# Patient Record
Sex: MALE | Race: WHITE | NOT HISPANIC OR LATINO
[De-identification: names, ages, dates, MRNs, and addresses within clinical notes are randomized per-mention and may not be internally consistent; named-entity substitution may affect disease eponyms.]

---

## 2017-01-13 ENCOUNTER — APPOINTMENT (OUTPATIENT)
Dept: CARDIOLOGY | Facility: CLINIC | Age: 77
End: 2017-01-13

## 2017-01-27 ENCOUNTER — APPOINTMENT (OUTPATIENT)
Dept: CARDIOLOGY | Facility: CLINIC | Age: 77
End: 2017-01-27

## 2017-01-27 VITALS
DIASTOLIC BLOOD PRESSURE: 86 MMHG | HEIGHT: 72 IN | SYSTOLIC BLOOD PRESSURE: 139 MMHG | OXYGEN SATURATION: 100 % | HEART RATE: 75 BPM | WEIGHT: 170 LBS | BODY MASS INDEX: 23.03 KG/M2

## 2017-01-27 RX ORDER — ROSUVASTATIN CALCIUM 5 MG/1
5 TABLET, FILM COATED ORAL
Refills: 0 | Status: ACTIVE | COMMUNITY

## 2017-05-25 ENCOUNTER — APPOINTMENT (OUTPATIENT)
Dept: VASCULAR SURGERY | Facility: CLINIC | Age: 77
End: 2017-05-25

## 2017-05-25 VITALS
HEIGHT: 72 IN | BODY MASS INDEX: 23.7 KG/M2 | SYSTOLIC BLOOD PRESSURE: 120 MMHG | DIASTOLIC BLOOD PRESSURE: 64 MMHG | WEIGHT: 175 LBS

## 2017-06-08 ENCOUNTER — APPOINTMENT (OUTPATIENT)
Dept: VASCULAR SURGERY | Facility: CLINIC | Age: 77
End: 2017-06-08

## 2017-06-08 ENCOUNTER — OUTPATIENT (OUTPATIENT)
Dept: OUTPATIENT SERVICES | Facility: HOSPITAL | Age: 77
LOS: 1 days | Discharge: HOME | End: 2017-06-08

## 2017-06-08 DIAGNOSIS — Z95.0 PRESENCE OF CARDIAC PACEMAKER: ICD-10-CM

## 2017-06-08 DIAGNOSIS — I63.9 CEREBRAL INFARCTION, UNSPECIFIED: ICD-10-CM

## 2017-06-08 DIAGNOSIS — K21.9 GASTRO-ESOPHAGEAL REFLUX DISEASE WITHOUT ESOPHAGITIS: ICD-10-CM

## 2017-06-08 DIAGNOSIS — I77.9 DISORDER OF ARTERIES AND ARTERIOLES, UNSPECIFIED: ICD-10-CM

## 2017-06-08 DIAGNOSIS — E78.00 PURE HYPERCHOLESTEROLEMIA, UNSPECIFIED: ICD-10-CM

## 2017-06-08 DIAGNOSIS — I50.9 HEART FAILURE, UNSPECIFIED: ICD-10-CM

## 2017-06-08 DIAGNOSIS — E11.9 TYPE 2 DIABETES MELLITUS WITHOUT COMPLICATIONS: ICD-10-CM

## 2017-06-08 DIAGNOSIS — Z95.5 PRESENCE OF CORONARY ANGIOPLASTY IMPLANT AND GRAFT: ICD-10-CM

## 2017-06-08 DIAGNOSIS — L03.90 CELLULITIS, UNSPECIFIED: ICD-10-CM

## 2017-06-08 DIAGNOSIS — I10 ESSENTIAL (PRIMARY) HYPERTENSION: ICD-10-CM

## 2017-06-08 DIAGNOSIS — I25.10 ATHEROSCLEROTIC HEART DISEASE OF NATIVE CORONARY ARTERY WITHOUT ANGINA PECTORIS: ICD-10-CM

## 2017-06-08 DIAGNOSIS — I65.29 OCCLUSION AND STENOSIS OF UNSPECIFIED CAROTID ARTERY: ICD-10-CM

## 2017-06-08 DIAGNOSIS — R06.02 SHORTNESS OF BREATH: ICD-10-CM

## 2017-06-14 ENCOUNTER — TRANSCRIPTION ENCOUNTER (OUTPATIENT)
Age: 77
End: 2017-06-14

## 2017-06-19 ENCOUNTER — APPOINTMENT (OUTPATIENT)
Dept: CARDIOLOGY | Facility: CLINIC | Age: 77
End: 2017-06-19

## 2017-06-27 ENCOUNTER — APPOINTMENT (OUTPATIENT)
Dept: VASCULAR SURGERY | Facility: HOSPITAL | Age: 77
End: 2017-06-27

## 2017-06-27 ENCOUNTER — INPATIENT (INPATIENT)
Facility: HOSPITAL | Age: 77
LOS: 1 days | Discharge: NURSING, REHAB OR LONG-TERM CARE | End: 2017-06-29
Attending: SURGERY

## 2017-06-27 DIAGNOSIS — I65.29 OCCLUSION AND STENOSIS OF UNSPECIFIED CAROTID ARTERY: ICD-10-CM
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
## 2017-06-27 DIAGNOSIS — I77.9 DISORDER OF ARTERIES AND ARTERIOLES, UNSPECIFIED: ICD-10-CM

## 2017-06-27 DIAGNOSIS — L03.90 CELLULITIS, UNSPECIFIED: ICD-10-CM

## 2017-06-27 DIAGNOSIS — R06.02 SHORTNESS OF BREATH: ICD-10-CM

## 2017-06-27 DIAGNOSIS — T87.89 OTHER COMPLICATIONS OF AMPUTATION STUMP: ICD-10-CM

## 2017-06-28 DIAGNOSIS — Z01.818 ENCOUNTER FOR OTHER PREPROCEDURAL EXAMINATION: ICD-10-CM

## 2017-06-28 DIAGNOSIS — M79.605 PAIN IN LEFT LEG: ICD-10-CM

## 2017-06-29 ENCOUNTER — INPATIENT (INPATIENT)
Facility: HOSPITAL | Age: 77
LOS: 14 days | Discharge: NURSING, REHAB OR LONG-TERM CARE | End: 2017-07-14
Attending: PHYSICAL MEDICINE & REHABILITATION

## 2017-06-29 DIAGNOSIS — I65.29 OCCLUSION AND STENOSIS OF UNSPECIFIED CAROTID ARTERY: ICD-10-CM

## 2017-06-29 DIAGNOSIS — I77.9 DISORDER OF ARTERIES AND ARTERIOLES, UNSPECIFIED: ICD-10-CM

## 2017-06-29 DIAGNOSIS — L03.90 CELLULITIS, UNSPECIFIED: ICD-10-CM

## 2017-06-29 DIAGNOSIS — R06.02 SHORTNESS OF BREATH: ICD-10-CM

## 2017-06-30 ENCOUNTER — APPOINTMENT (OUTPATIENT)
Dept: VASCULAR SURGERY | Facility: HOSPITAL | Age: 77
End: 2017-06-30

## 2017-07-05 DIAGNOSIS — T87.89 OTHER COMPLICATIONS OF AMPUTATION STUMP: ICD-10-CM

## 2017-07-05 DIAGNOSIS — M79.605 PAIN IN LEFT LEG: ICD-10-CM

## 2017-07-05 DIAGNOSIS — Z86.73 PERSONAL HISTORY OF TRANSIENT ISCHEMIC ATTACK (TIA), AND CEREBRAL INFARCTION WITHOUT RESIDUAL DEFICITS: ICD-10-CM

## 2017-07-05 DIAGNOSIS — I50.9 HEART FAILURE, UNSPECIFIED: ICD-10-CM

## 2017-07-05 DIAGNOSIS — M77.9 ENTHESOPATHY, UNSPECIFIED: ICD-10-CM

## 2017-07-05 DIAGNOSIS — M19.90 UNSPECIFIED OSTEOARTHRITIS, UNSPECIFIED SITE: ICD-10-CM

## 2017-07-05 DIAGNOSIS — E11.59 TYPE 2 DIABETES MELLITUS WITH OTHER CIRCULATORY COMPLICATIONS: ICD-10-CM

## 2017-07-05 DIAGNOSIS — K44.9 DIAPHRAGMATIC HERNIA WITHOUT OBSTRUCTION OR GANGRENE: ICD-10-CM

## 2017-07-05 DIAGNOSIS — Z95.1 PRESENCE OF AORTOCORONARY BYPASS GRAFT: ICD-10-CM

## 2017-07-05 DIAGNOSIS — I25.10 ATHEROSCLEROTIC HEART DISEASE OF NATIVE CORONARY ARTERY WITHOUT ANGINA PECTORIS: ICD-10-CM

## 2017-07-05 DIAGNOSIS — Z95.810 PRESENCE OF AUTOMATIC (IMPLANTABLE) CARDIAC DEFIBRILLATOR: ICD-10-CM

## 2017-07-05 DIAGNOSIS — K21.9 GASTRO-ESOPHAGEAL REFLUX DISEASE WITHOUT ESOPHAGITIS: ICD-10-CM

## 2017-07-05 DIAGNOSIS — I25.2 OLD MYOCARDIAL INFARCTION: ICD-10-CM

## 2017-07-05 DIAGNOSIS — E11.51 TYPE 2 DIABETES MELLITUS WITH DIABETIC PERIPHERAL ANGIOPATHY WITHOUT GANGRENE: ICD-10-CM

## 2017-07-05 DIAGNOSIS — L02.214 CUTANEOUS ABSCESS OF GROIN: ICD-10-CM

## 2017-07-05 DIAGNOSIS — I73.9 PERIPHERAL VASCULAR DISEASE, UNSPECIFIED: ICD-10-CM

## 2017-07-05 DIAGNOSIS — I73.00 RAYNAUD'S SYNDROME WITHOUT GANGRENE: ICD-10-CM

## 2017-07-05 DIAGNOSIS — K27.9 PEPTIC ULCER, SITE UNSPECIFIED, UNSPECIFIED AS ACUTE OR CHRONIC, WITHOUT HEMORRHAGE OR PERFORATION: ICD-10-CM

## 2017-07-18 DIAGNOSIS — Z89.512 ACQUIRED ABSENCE OF LEFT LEG BELOW KNEE: ICD-10-CM

## 2017-07-18 DIAGNOSIS — Z91.14 PATIENT'S OTHER NONCOMPLIANCE WITH MEDICATION REGIMEN: ICD-10-CM

## 2017-07-18 DIAGNOSIS — Z47.81 ENCOUNTER FOR ORTHOPEDIC AFTERCARE FOLLOWING SURGICAL AMPUTATION: ICD-10-CM

## 2017-07-18 DIAGNOSIS — I25.10 ATHEROSCLEROTIC HEART DISEASE OF NATIVE CORONARY ARTERY WITHOUT ANGINA PECTORIS: ICD-10-CM

## 2017-07-18 DIAGNOSIS — G89.29 OTHER CHRONIC PAIN: ICD-10-CM

## 2017-07-18 DIAGNOSIS — K44.9 DIAPHRAGMATIC HERNIA WITHOUT OBSTRUCTION OR GANGRENE: ICD-10-CM

## 2017-07-18 DIAGNOSIS — E11.51 TYPE 2 DIABETES MELLITUS WITH DIABETIC PERIPHERAL ANGIOPATHY WITHOUT GANGRENE: ICD-10-CM

## 2017-07-18 DIAGNOSIS — I48.91 UNSPECIFIED ATRIAL FIBRILLATION: ICD-10-CM

## 2017-07-18 DIAGNOSIS — I50.20 UNSPECIFIED SYSTOLIC (CONGESTIVE) HEART FAILURE: ICD-10-CM

## 2017-07-18 DIAGNOSIS — K21.9 GASTRO-ESOPHAGEAL REFLUX DISEASE WITHOUT ESOPHAGITIS: ICD-10-CM

## 2017-07-18 DIAGNOSIS — L02.214 CUTANEOUS ABSCESS OF GROIN: ICD-10-CM

## 2017-07-18 DIAGNOSIS — Z95.810 PRESENCE OF AUTOMATIC (IMPLANTABLE) CARDIAC DEFIBRILLATOR: ICD-10-CM

## 2017-07-18 DIAGNOSIS — M79.674 PAIN IN RIGHT TOE(S): ICD-10-CM

## 2017-07-18 DIAGNOSIS — M19.90 UNSPECIFIED OSTEOARTHRITIS, UNSPECIFIED SITE: ICD-10-CM

## 2017-07-18 DIAGNOSIS — R60.0 LOCALIZED EDEMA: ICD-10-CM

## 2017-07-18 DIAGNOSIS — Z86.73 PERSONAL HISTORY OF TRANSIENT ISCHEMIC ATTACK (TIA), AND CEREBRAL INFARCTION WITHOUT RESIDUAL DEFICITS: ICD-10-CM

## 2017-07-18 DIAGNOSIS — Z95.1 PRESENCE OF AORTOCORONARY BYPASS GRAFT: ICD-10-CM

## 2017-07-20 ENCOUNTER — APPOINTMENT (OUTPATIENT)
Dept: VASCULAR SURGERY | Facility: CLINIC | Age: 77
End: 2017-07-20

## 2017-07-20 DIAGNOSIS — I50.22 CHRONIC SYSTOLIC (CONGESTIVE) HEART FAILURE: ICD-10-CM

## 2017-08-03 ENCOUNTER — APPOINTMENT (OUTPATIENT)
Dept: VASCULAR SURGERY | Facility: CLINIC | Age: 77
End: 2017-08-03
Payer: MEDICARE

## 2017-08-03 PROCEDURE — 99024 POSTOP FOLLOW-UP VISIT: CPT

## 2017-08-14 ENCOUNTER — INPATIENT (INPATIENT)
Facility: HOSPITAL | Age: 77
LOS: 2 days | Discharge: HOME | End: 2017-08-17
Attending: INTERNAL MEDICINE | Admitting: INTERNAL MEDICINE

## 2017-08-14 DIAGNOSIS — L03.90 CELLULITIS, UNSPECIFIED: ICD-10-CM

## 2017-08-14 DIAGNOSIS — R06.02 SHORTNESS OF BREATH: ICD-10-CM

## 2017-08-14 DIAGNOSIS — I77.9 DISORDER OF ARTERIES AND ARTERIOLES, UNSPECIFIED: ICD-10-CM

## 2017-08-14 DIAGNOSIS — I65.29 OCCLUSION AND STENOSIS OF UNSPECIFIED CAROTID ARTERY: ICD-10-CM

## 2017-08-21 DIAGNOSIS — Z95.1 PRESENCE OF AORTOCORONARY BYPASS GRAFT: ICD-10-CM

## 2017-08-21 DIAGNOSIS — I25.10 ATHEROSCLEROTIC HEART DISEASE OF NATIVE CORONARY ARTERY WITHOUT ANGINA PECTORIS: ICD-10-CM

## 2017-08-21 DIAGNOSIS — I11.0 HYPERTENSIVE HEART DISEASE WITH HEART FAILURE: ICD-10-CM

## 2017-08-21 DIAGNOSIS — N40.0 BENIGN PROSTATIC HYPERPLASIA WITHOUT LOWER URINARY TRACT SYMPTOMS: ICD-10-CM

## 2017-08-21 DIAGNOSIS — K58.9 IRRITABLE BOWEL SYNDROME WITHOUT DIARRHEA: ICD-10-CM

## 2017-08-21 DIAGNOSIS — E11.51 TYPE 2 DIABETES MELLITUS WITH DIABETIC PERIPHERAL ANGIOPATHY WITHOUT GANGRENE: ICD-10-CM

## 2017-08-21 DIAGNOSIS — M10.9 GOUT, UNSPECIFIED: ICD-10-CM

## 2017-08-21 DIAGNOSIS — Z95.810 PRESENCE OF AUTOMATIC (IMPLANTABLE) CARDIAC DEFIBRILLATOR: ICD-10-CM
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
## 2017-08-21 DIAGNOSIS — Z89.512 ACQUIRED ABSENCE OF LEFT LEG BELOW KNEE: ICD-10-CM

## 2017-08-21 DIAGNOSIS — Z86.73 PERSONAL HISTORY OF TRANSIENT ISCHEMIC ATTACK (TIA), AND CEREBRAL INFARCTION WITHOUT RESIDUAL DEFICITS: ICD-10-CM

## 2017-08-21 DIAGNOSIS — I50.23 ACUTE ON CHRONIC SYSTOLIC (CONGESTIVE) HEART FAILURE: ICD-10-CM

## 2017-08-21 DIAGNOSIS — I73.00 RAYNAUD'S SYNDROME WITHOUT GANGRENE: ICD-10-CM

## 2017-08-21 DIAGNOSIS — K22.70 BARRETT'S ESOPHAGUS WITHOUT DYSPLASIA: ICD-10-CM

## 2017-08-21 DIAGNOSIS — L03.90 CELLULITIS, UNSPECIFIED: ICD-10-CM

## 2017-08-21 DIAGNOSIS — K21.9 GASTRO-ESOPHAGEAL REFLUX DISEASE WITHOUT ESOPHAGITIS: ICD-10-CM

## 2017-08-21 DIAGNOSIS — T82.190A OTHER MECHANICAL COMPLICATION OF CARDIAC ELECTRODE, INITIAL ENCOUNTER: ICD-10-CM

## 2017-08-21 DIAGNOSIS — I48.91 UNSPECIFIED ATRIAL FIBRILLATION: ICD-10-CM

## 2017-08-21 DIAGNOSIS — I25.2 OLD MYOCARDIAL INFARCTION: ICD-10-CM

## 2017-08-21 DIAGNOSIS — E11.9 TYPE 2 DIABETES MELLITUS WITHOUT COMPLICATIONS: ICD-10-CM

## 2017-08-24 DIAGNOSIS — I70.291 OTHER ATHEROSCLEROSIS OF NATIVE ARTERIES OF EXTREMITIES, RIGHT LEG: ICD-10-CM

## 2017-08-24 DIAGNOSIS — M19.071 PRIMARY OSTEOARTHRITIS, RIGHT ANKLE AND FOOT: ICD-10-CM

## 2017-09-01 ENCOUNTER — APPOINTMENT (OUTPATIENT)
Dept: CARDIOLOGY | Facility: CLINIC | Age: 77
End: 2017-09-01

## 2017-09-07 ENCOUNTER — APPOINTMENT (OUTPATIENT)
Dept: VASCULAR SURGERY | Facility: CLINIC | Age: 77
End: 2017-09-07
Payer: MEDICARE

## 2017-09-07 VITALS — WEIGHT: 190 LBS | HEIGHT: 72 IN | BODY MASS INDEX: 25.73 KG/M2

## 2017-09-07 VITALS — SYSTOLIC BLOOD PRESSURE: 118 MMHG | DIASTOLIC BLOOD PRESSURE: 74 MMHG

## 2017-09-07 DIAGNOSIS — L97.909 OTHER COMPLICATIONS OF AMPUTATION STUMP: ICD-10-CM

## 2017-09-07 DIAGNOSIS — T87.89 OTHER COMPLICATIONS OF AMPUTATION STUMP: ICD-10-CM

## 2017-09-07 PROCEDURE — 99213 OFFICE O/P EST LOW 20 MIN: CPT | Mod: 24

## 2017-09-11 ENCOUNTER — APPOINTMENT (OUTPATIENT)
Dept: CARDIOLOGY | Facility: CLINIC | Age: 77
End: 2017-09-11

## 2017-09-11 VITALS — WEIGHT: 190 LBS | DIASTOLIC BLOOD PRESSURE: 64 MMHG | SYSTOLIC BLOOD PRESSURE: 110 MMHG | BODY MASS INDEX: 25.77 KG/M2

## 2017-09-11 DIAGNOSIS — I47.2 VENTRICULAR TACHYCARDIA: ICD-10-CM

## 2017-09-12 PROBLEM — I47.2 VENTRICULAR TACHYCARDIA: Status: ACTIVE | Noted: 2017-09-12

## 2017-09-21 ENCOUNTER — APPOINTMENT (OUTPATIENT)
Dept: VASCULAR SURGERY | Facility: CLINIC | Age: 77
End: 2017-09-21

## 2017-09-28 ENCOUNTER — APPOINTMENT (OUTPATIENT)
Dept: VASCULAR SURGERY | Facility: CLINIC | Age: 77
End: 2017-09-28
Payer: MEDICARE

## 2017-09-28 DIAGNOSIS — L97.519 NON-PRESSURE CHRONIC ULCER OF OTHER PART OF RIGHT FOOT WITH UNSPECIFIED SEVERITY: ICD-10-CM

## 2017-09-28 PROCEDURE — 99213 OFFICE O/P EST LOW 20 MIN: CPT

## 2017-09-29 ENCOUNTER — APPOINTMENT (OUTPATIENT)
Dept: CARDIOLOGY | Facility: CLINIC | Age: 77
End: 2017-09-29

## 2017-09-29 VITALS
HEIGHT: 72 IN | BODY MASS INDEX: 25.73 KG/M2 | WEIGHT: 190 LBS | OXYGEN SATURATION: 100 % | SYSTOLIC BLOOD PRESSURE: 113 MMHG | HEART RATE: 70 BPM | DIASTOLIC BLOOD PRESSURE: 65 MMHG

## 2017-10-13 ENCOUNTER — APPOINTMENT (OUTPATIENT)
Dept: CARDIOLOGY | Facility: CLINIC | Age: 77
End: 2017-10-13

## 2017-10-18 ENCOUNTER — APPOINTMENT (OUTPATIENT)
Dept: CARDIOLOGY | Facility: CLINIC | Age: 77
End: 2017-10-18

## 2017-10-18 VITALS
DIASTOLIC BLOOD PRESSURE: 52 MMHG | HEART RATE: 70 BPM | BODY MASS INDEX: 25.77 KG/M2 | SYSTOLIC BLOOD PRESSURE: 110 MMHG | WEIGHT: 190 LBS

## 2017-10-18 DIAGNOSIS — I48.92 UNSPECIFIED ATRIAL FLUTTER: ICD-10-CM

## 2017-10-18 RX ORDER — AZELASTINE HYDROCHLORIDE 0.5 MG/ML
0.05 SOLUTION/ DROPS OPHTHALMIC
Qty: 6 | Refills: 0 | Status: ACTIVE | COMMUNITY
Start: 2017-08-28

## 2017-10-18 RX ORDER — APIXABAN 5 MG/1
5 TABLET, FILM COATED ORAL
Qty: 60 | Refills: 0 | Status: ACTIVE | COMMUNITY
Start: 2017-09-12

## 2017-10-18 RX ORDER — ONDANSETRON 8 MG/1
8 TABLET ORAL
Qty: 30 | Refills: 0 | Status: ACTIVE | COMMUNITY
Start: 2017-09-02

## 2017-10-18 RX ORDER — CIPROFLOXACIN 3 MG/ML
0.3 SOLUTION OPHTHALMIC
Qty: 5 | Refills: 0 | Status: ACTIVE | COMMUNITY
Start: 2017-06-14

## 2017-10-18 RX ORDER — LIDOCAINE 5 G/100G
5 OINTMENT TOPICAL
Qty: 50 | Refills: 0 | Status: ACTIVE | COMMUNITY
Start: 2017-08-31

## 2017-10-25 ENCOUNTER — APPOINTMENT (OUTPATIENT)
Dept: CARDIOLOGY | Facility: CLINIC | Age: 77
End: 2017-10-25

## 2017-10-26 ENCOUNTER — APPOINTMENT (OUTPATIENT)
Dept: VASCULAR SURGERY | Facility: CLINIC | Age: 77
End: 2017-10-26
Payer: MEDICARE

## 2017-10-26 PROCEDURE — 99213 OFFICE O/P EST LOW 20 MIN: CPT

## 2017-10-30 ENCOUNTER — OUTPATIENT (OUTPATIENT)
Dept: OUTPATIENT SERVICES | Facility: HOSPITAL | Age: 77
LOS: 1 days | Discharge: HOME | End: 2017-10-30

## 2017-10-30 ENCOUNTER — APPOINTMENT (OUTPATIENT)
Dept: CARDIOLOGY | Facility: CLINIC | Age: 77
End: 2017-10-30

## 2017-10-30 DIAGNOSIS — I25.10 ATHEROSCLEROTIC HEART DISEASE OF NATIVE CORONARY ARTERY WITHOUT ANGINA PECTORIS: ICD-10-CM

## 2017-10-30 DIAGNOSIS — I50.22 CHRONIC SYSTOLIC (CONGESTIVE) HEART FAILURE: ICD-10-CM

## 2017-10-30 DIAGNOSIS — I77.9 DISORDER OF ARTERIES AND ARTERIOLES, UNSPECIFIED: ICD-10-CM

## 2017-10-30 DIAGNOSIS — I25.10 ATHEROSCLEROTIC HEART DISEASE OF NATIVE CORONARY ARTERY W/OUT ANGINA PECTORIS: ICD-10-CM

## 2017-10-30 DIAGNOSIS — I65.29 OCCLUSION AND STENOSIS OF UNSPECIFIED CAROTID ARTERY: ICD-10-CM

## 2017-10-30 DIAGNOSIS — R06.02 SHORTNESS OF BREATH: ICD-10-CM

## 2017-10-30 DIAGNOSIS — L03.90 CELLULITIS, UNSPECIFIED: ICD-10-CM

## 2017-11-01 ENCOUNTER — OUTPATIENT (OUTPATIENT)
Dept: OUTPATIENT SERVICES | Facility: HOSPITAL | Age: 77
LOS: 1 days | Discharge: HOME | End: 2017-11-01

## 2017-11-01 DIAGNOSIS — L03.90 CELLULITIS, UNSPECIFIED: ICD-10-CM

## 2017-11-01 DIAGNOSIS — T87.89 OTHER COMPLICATIONS OF AMPUTATION STUMP: ICD-10-CM

## 2017-11-01 DIAGNOSIS — R06.02 SHORTNESS OF BREATH: ICD-10-CM

## 2017-11-01 DIAGNOSIS — I77.9 DISORDER OF ARTERIES AND ARTERIOLES, UNSPECIFIED: ICD-10-CM

## 2017-11-01 DIAGNOSIS — I65.29 OCCLUSION AND STENOSIS OF UNSPECIFIED CAROTID ARTERY: ICD-10-CM

## 2017-11-22 ENCOUNTER — TRANSCRIPTION ENCOUNTER (OUTPATIENT)
Age: 77
End: 2017-11-22

## 2017-12-07 ENCOUNTER — APPOINTMENT (OUTPATIENT)
Dept: VASCULAR SURGERY | Facility: CLINIC | Age: 77
End: 2017-12-07
Payer: MEDICARE

## 2017-12-07 PROCEDURE — 99213 OFFICE O/P EST LOW 20 MIN: CPT

## 2017-12-07 PROCEDURE — 93880 EXTRACRANIAL BILAT STUDY: CPT

## 2017-12-18 ENCOUNTER — OUTPATIENT (OUTPATIENT)
Dept: OUTPATIENT SERVICES | Facility: HOSPITAL | Age: 77
LOS: 1 days | Discharge: HOME | End: 2017-12-18

## 2017-12-18 DIAGNOSIS — R06.02 SHORTNESS OF BREATH: ICD-10-CM

## 2017-12-18 DIAGNOSIS — L03.90 CELLULITIS, UNSPECIFIED: ICD-10-CM

## 2017-12-18 DIAGNOSIS — I77.9 DISORDER OF ARTERIES AND ARTERIOLES, UNSPECIFIED: ICD-10-CM

## 2017-12-18 DIAGNOSIS — I65.29 OCCLUSION AND STENOSIS OF UNSPECIFIED CAROTID ARTERY: ICD-10-CM

## 2017-12-20 DIAGNOSIS — L03.012 CELLULITIS OF LEFT FINGER: ICD-10-CM

## 2017-12-21 ENCOUNTER — OUTPATIENT (OUTPATIENT)
Dept: OUTPATIENT SERVICES | Facility: HOSPITAL | Age: 77
LOS: 1 days | Discharge: HOME | End: 2017-12-21

## 2017-12-21 DIAGNOSIS — I77.9 DISORDER OF ARTERIES AND ARTERIOLES, UNSPECIFIED: ICD-10-CM

## 2017-12-21 DIAGNOSIS — D53.9 NUTRITIONAL ANEMIA, UNSPECIFIED: ICD-10-CM

## 2017-12-21 DIAGNOSIS — I65.29 OCCLUSION AND STENOSIS OF UNSPECIFIED CAROTID ARTERY: ICD-10-CM

## 2017-12-21 DIAGNOSIS — L03.90 CELLULITIS, UNSPECIFIED: ICD-10-CM

## 2017-12-21 DIAGNOSIS — M79.0 RHEUMATISM, UNSPECIFIED: ICD-10-CM

## 2017-12-21 DIAGNOSIS — R06.02 SHORTNESS OF BREATH: ICD-10-CM

## 2018-02-05 ENCOUNTER — APPOINTMENT (OUTPATIENT)
Dept: VASCULAR SURGERY | Facility: CLINIC | Age: 78
End: 2018-02-05
Payer: MEDICARE

## 2018-02-05 DIAGNOSIS — I65.23 OCCLUSION AND STENOSIS OF BILATERAL CAROTID ARTERIES: ICD-10-CM

## 2018-02-05 DIAGNOSIS — G89.18 OTHER COMPLICATIONS OF AMPUTATION STUMP: ICD-10-CM

## 2018-02-05 DIAGNOSIS — T87.89 OTHER COMPLICATIONS OF AMPUTATION STUMP: ICD-10-CM

## 2018-02-05 PROCEDURE — 99213 OFFICE O/P EST LOW 20 MIN: CPT

## 2018-02-05 PROCEDURE — 99214 OFFICE O/P EST MOD 30 MIN: CPT

## 2018-05-07 ENCOUNTER — OUTPATIENT (OUTPATIENT)
Dept: OUTPATIENT SERVICES | Facility: HOSPITAL | Age: 78
LOS: 1 days | Discharge: HOME | End: 2018-05-07

## 2018-05-07 DIAGNOSIS — E78.00 PURE HYPERCHOLESTEROLEMIA, UNSPECIFIED: ICD-10-CM

## 2018-05-07 DIAGNOSIS — N39.0 URINARY TRACT INFECTION, SITE NOT SPECIFIED: ICD-10-CM

## 2018-05-07 DIAGNOSIS — E11.9 TYPE 2 DIABETES MELLITUS WITHOUT COMPLICATIONS: ICD-10-CM

## 2018-05-14 ENCOUNTER — EMERGENCY (EMERGENCY)
Facility: HOSPITAL | Age: 78
LOS: 0 days | Discharge: HOME | End: 2018-05-14
Attending: EMERGENCY MEDICINE | Admitting: EMERGENCY MEDICINE

## 2018-05-14 ENCOUNTER — TRANSCRIPTION ENCOUNTER (OUTPATIENT)
Age: 78
End: 2018-05-14

## 2018-05-14 VITALS
TEMPERATURE: 98 F | HEART RATE: 70 BPM | RESPIRATION RATE: 20 BRPM | DIASTOLIC BLOOD PRESSURE: 57 MMHG | SYSTOLIC BLOOD PRESSURE: 116 MMHG | OXYGEN SATURATION: 99 %

## 2018-05-14 DIAGNOSIS — Z98.890 OTHER SPECIFIED POSTPROCEDURAL STATES: ICD-10-CM

## 2018-05-14 DIAGNOSIS — Z89.512 ACQUIRED ABSENCE OF LEFT LEG BELOW KNEE: Chronic | ICD-10-CM

## 2018-05-14 DIAGNOSIS — E11.9 TYPE 2 DIABETES MELLITUS WITHOUT COMPLICATIONS: ICD-10-CM

## 2018-05-14 DIAGNOSIS — S09.90XA UNSPECIFIED INJURY OF HEAD, INITIAL ENCOUNTER: ICD-10-CM

## 2018-05-14 DIAGNOSIS — S22.32XA FRACTURE OF ONE RIB, LEFT SIDE, INITIAL ENCOUNTER FOR CLOSED FRACTURE: ICD-10-CM

## 2018-05-14 DIAGNOSIS — R07.81 PLEURODYNIA: ICD-10-CM
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
## 2018-05-14 DIAGNOSIS — Z95.0 PRESENCE OF CARDIAC PACEMAKER: ICD-10-CM
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
## 2018-05-14 DIAGNOSIS — I50.9 HEART FAILURE, UNSPECIFIED: ICD-10-CM

## 2018-05-14 RX ORDER — IBUPROFEN 200 MG
600 TABLET ORAL ONCE
Qty: 0 | Refills: 0 | Status: COMPLETED | OUTPATIENT
Start: 2018-05-14 | End: 2018-05-14

## 2018-05-14 RX ORDER — IBUPROFEN 200 MG
1 TABLET ORAL
Qty: 40 | Refills: 0 | OUTPATIENT
Start: 2018-05-14 | End: 2018-05-23

## 2018-05-14 RX ADMIN — Medication 600 MILLIGRAM(S): at 15:33
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
## 2018-05-14 NOTE — ED PROVIDER NOTE - PHYSICAL EXAMINATION
CONST: Well appearing in NAD  EYES: PERRL, EOMI, Sclera and conjunctiva clear.  ENT: No nasal discharge. TM's clear B/L without drainage. Oropharynx normal appearing, no erythema or exudates. Uvula midline.  CARD: Normal S1 S2; Normal rate and rhythm  RESP: Equal BS B/L, No wheezes, rhonchi or rales.  GI: Soft, non-tender, non-distended, (-) CVA tenderness B/L, no overlying skin changes  MS: (+) left lower anterior rib TTP, Normal ROM in all extremities. No midline spinal tenderness.  SKIN: Warm, dry, no acute rashes. Good turgor  NEURO: A&Ox3, No focal deficits. Strength 5/5 with no sensory deficits. Steady gait

## 2018-05-14 NOTE — ED PROVIDER NOTE - OBJECTIVE STATEMENT
Patient is a 76 yo male with PM hx of AICD placement, left BKA, presents to the ER s/p fall yesterday. Patient states he tripped over his dog and fell hitting his head and left side of chest. Patient was seen earlier today at [facility] and had x-rays but were unable to identify any ribs fractures and was sent into ER for further evaluation. Patient denies any other symptoms, without any other complaints

## 2018-05-14 NOTE — ED ADULT NURSE NOTE - OBJECTIVE STATEMENT
Pt is a 76 y/o male presented with rib pain s/p mechanical fall on Saturday, stating fell on left side tripping over his dog. no anticoags no loc, no n/v, no dizziness, tender to touch, no c/o chest pain. no fever.

## 2018-05-14 NOTE — ED PROVIDER NOTE - NS ED ROS FT
CONST: No fever  EYES: No pain, redness, drainage or visual changes.  ENT: No ear pain or discharge, nasal discharge or congestion. No sore throat  CARD: (+) left sided lower frontal rib pain  RESP: No SOB, cough  GI: No abdominal pain, N/V/D  MS: (+) left lower back pain  SKIN: No rashes  NEURO: No headache, dizziness, paresthesias or LOC

## 2018-05-14 NOTE — ED ADULT NURSE NOTE - PMH
Problem: Knowledge Deficit  Goal: Knowledge of disease process/condition, treatment plan, diagnostic tests, and medications will improve  Outcome: PROGRESSING AS EXPECTED  Educated on disease process.     Problem: Pain Management  Goal: Pain level will decrease to patient’s comfort goal  Outcome: PROGRESSING AS EXPECTED  Pt complains of chest pain. Relieved with tylenol          Gar esophagus    CAD (coronary artery disease)    CHF (congestive heart failure)    DM (diabetes mellitus)    Endarteritis    MI (myocardial infarction)    Pacemaker    Stented coronary artery

## 2018-05-14 NOTE — ED ADULT TRIAGE NOTE - CHIEF COMPLAINT QUOTE
Pt states s/p fall yesterday attempting to move away from dog and lost balance landing on L side. - Loc not on any blood thinners. Pt c/o L sided rib pain sent in from Urgent care.

## 2018-05-19 ENCOUNTER — INPATIENT (INPATIENT)
Facility: HOSPITAL | Age: 78
LOS: 5 days | Discharge: HOME HEALTH | End: 2018-05-25
Attending: INTERNAL MEDICINE | Admitting: INTERNAL MEDICINE

## 2018-05-19 VITALS
SYSTOLIC BLOOD PRESSURE: 118 MMHG | TEMPERATURE: 96 F | HEART RATE: 95 BPM | RESPIRATION RATE: 18 BRPM | OXYGEN SATURATION: 96 % | DIASTOLIC BLOOD PRESSURE: 61 MMHG
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
## 2018-05-19 DIAGNOSIS — Z89.512 ACQUIRED ABSENCE OF LEFT LEG BELOW KNEE: Chronic | ICD-10-CM

## 2018-05-19 DIAGNOSIS — Z95.1 PRESENCE OF AORTOCORONARY BYPASS GRAFT: Chronic | ICD-10-CM

## 2018-05-19 LAB
ALBUMIN SERPL ELPH-MCNC: 2.7 G/DL — LOW (ref 3.5–5.2)
ALP SERPL-CCNC: 96 U/L — SIGNIFICANT CHANGE UP (ref 30–115)
ALT FLD-CCNC: 14 U/L — SIGNIFICANT CHANGE UP (ref 0–41)
ANION GAP SERPL CALC-SCNC: 12 MMOL/L — SIGNIFICANT CHANGE UP (ref 7–14)
ANION GAP SERPL CALC-SCNC: 14 MMOL/L — SIGNIFICANT CHANGE UP (ref 7–14)
APTT BLD: 29.8 SEC — SIGNIFICANT CHANGE UP (ref 27–39.2)
AST SERPL-CCNC: 42 U/L — HIGH (ref 0–41)
BASE EXCESS BLDV CALC-SCNC: 2.6 MMOL/L — HIGH (ref -2–2)
BASOPHILS # BLD AUTO: 0.05 K/UL — SIGNIFICANT CHANGE UP (ref 0–0.2)
BASOPHILS NFR BLD AUTO: 0.6 % — SIGNIFICANT CHANGE UP (ref 0–1)
BILIRUB SERPL-MCNC: 0.5 MG/DL — SIGNIFICANT CHANGE UP (ref 0.2–1.2)
BUN SERPL-MCNC: 57 MG/DL — HIGH (ref 10–20)
BUN SERPL-MCNC: 62 MG/DL — CRITICAL HIGH (ref 10–20)
CA-I SERPL-SCNC: 1.11 MMOL/L — LOW (ref 1.12–1.3)
CALCIUM SERPL-MCNC: 7.8 MG/DL — LOW (ref 8.5–10.1)
CALCIUM SERPL-MCNC: 8 MG/DL — LOW (ref 8.5–10.1)
CHLORIDE SERPL-SCNC: 97 MMOL/L — LOW (ref 98–110)
CHLORIDE SERPL-SCNC: 98 MMOL/L — SIGNIFICANT CHANGE UP (ref 98–110)
CK MB CFR SERPL CALC: 7.3 NG/ML — HIGH (ref 0.6–6.3)
CK MB CFR SERPL CALC: 7.7 NG/ML — HIGH (ref 0.6–6.3)
CK SERPL-CCNC: 174 U/L — SIGNIFICANT CHANGE UP (ref 0–225)
CK SERPL-CCNC: 192 U/L — SIGNIFICANT CHANGE UP (ref 0–225)
CO2 SERPL-SCNC: 25 MMOL/L — SIGNIFICANT CHANGE UP (ref 17–32)
CO2 SERPL-SCNC: 28 MMOL/L — SIGNIFICANT CHANGE UP (ref 17–32)
CREAT SERPL-MCNC: 2.1 MG/DL — HIGH (ref 0.7–1.5)
CREAT SERPL-MCNC: 2.6 MG/DL — HIGH (ref 0.7–1.5)
EOSINOPHIL # BLD AUTO: 0.11 K/UL — SIGNIFICANT CHANGE UP (ref 0–0.7)
EOSINOPHIL NFR BLD AUTO: 1.4 % — SIGNIFICANT CHANGE UP (ref 0–8)
GAS PNL BLDV: 136 MMOL/L — SIGNIFICANT CHANGE UP (ref 136–145)
GAS PNL BLDV: SIGNIFICANT CHANGE UP
GAS PNL BLDV: SIGNIFICANT CHANGE UP
GLUCOSE SERPL-MCNC: 111 MG/DL — HIGH (ref 70–99)
GLUCOSE SERPL-MCNC: 154 MG/DL — HIGH (ref 70–99)
HCO3 BLDV-SCNC: 29 MMOL/L — SIGNIFICANT CHANGE UP (ref 22–29)
HCT VFR BLD CALC: 43.5 % — SIGNIFICANT CHANGE UP (ref 42–52)
HCT VFR BLDA CALC: 44.3 % — HIGH (ref 34–44)
HGB BLD CALC-MCNC: 14.5 G/DL — SIGNIFICANT CHANGE UP (ref 14–18)
HGB BLD-MCNC: 13.5 G/DL — LOW (ref 14–18)
IMM GRANULOCYTES NFR BLD AUTO: 0.4 % — HIGH (ref 0.1–0.3)
INR BLD: 1.18 RATIO — SIGNIFICANT CHANGE UP (ref 0.65–1.3)
LACTATE BLDV-MCNC: 1.7 MMOL/L — HIGH (ref 0.5–1.6)
LACTATE SERPL-SCNC: 1.6 MMOL/L — SIGNIFICANT CHANGE UP (ref 0.5–2.2)
LYMPHOCYTES # BLD AUTO: 0.44 K/UL — LOW (ref 1.2–3.4)
LYMPHOCYTES # BLD AUTO: 5.5 % — LOW (ref 20.5–51.1)
MCHC RBC-ENTMCNC: 25.2 PG — LOW (ref 27–31)
MCHC RBC-ENTMCNC: 31 G/DL — LOW (ref 32–37)
MCV RBC AUTO: 81.3 FL — SIGNIFICANT CHANGE UP (ref 80–94)
MONOCYTES # BLD AUTO: 1.13 K/UL — HIGH (ref 0.1–0.6)
MONOCYTES NFR BLD AUTO: 14.2 % — HIGH (ref 1.7–9.3)
NEUTROPHILS # BLD AUTO: 6.19 K/UL — SIGNIFICANT CHANGE UP (ref 1.4–6.5)
NEUTROPHILS NFR BLD AUTO: 77.9 % — HIGH (ref 42.2–75.2)
NRBC # BLD: 0 /100 WBCS — SIGNIFICANT CHANGE UP (ref 0–0)
NT-PROBNP SERPL-SCNC: HIGH PG/ML (ref 0–300)
PCO2 BLDV: 50 MMHG — SIGNIFICANT CHANGE UP (ref 41–51)
PH BLDV: 7.37 — SIGNIFICANT CHANGE UP (ref 7.26–7.43)
PLATELET # BLD AUTO: 279 K/UL — SIGNIFICANT CHANGE UP (ref 130–400)
PO2 BLDV: 27 MMHG — SIGNIFICANT CHANGE UP (ref 20–40)
POTASSIUM BLDV-SCNC: 3.9 MMOL/L — SIGNIFICANT CHANGE UP (ref 3.3–5.6)
POTASSIUM SERPL-MCNC: 4.1 MMOL/L — SIGNIFICANT CHANGE UP (ref 3.5–5)
POTASSIUM SERPL-MCNC: 5.7 MMOL/L — HIGH (ref 3.5–5)
POTASSIUM SERPL-SCNC: 4.1 MMOL/L — SIGNIFICANT CHANGE UP (ref 3.5–5)
POTASSIUM SERPL-SCNC: 5.7 MMOL/L — HIGH (ref 3.5–5)
PROT SERPL-MCNC: 5 G/DL — LOW (ref 6–8)
PROTHROM AB SERPL-ACNC: 12.8 SEC — SIGNIFICANT CHANGE UP (ref 9.95–12.87)
RBC # BLD: 5.35 M/UL — SIGNIFICANT CHANGE UP (ref 4.7–6.1)
RBC # FLD: 17.2 % — HIGH (ref 11.5–14.5)
SAO2 % BLDV: 39 % — SIGNIFICANT CHANGE UP
SODIUM SERPL-SCNC: 136 MMOL/L — SIGNIFICANT CHANGE UP (ref 135–146)
SODIUM SERPL-SCNC: 138 MMOL/L — SIGNIFICANT CHANGE UP (ref 135–146)
TROPONIN T SERPL-MCNC: 0.09 NG/ML — CRITICAL HIGH
TROPONIN T SERPL-MCNC: 0.1 NG/ML — CRITICAL HIGH
WBC # BLD: 7.95 K/UL — SIGNIFICANT CHANGE UP (ref 4.8–10.8)
WBC # FLD AUTO: 7.95 K/UL — SIGNIFICANT CHANGE UP (ref 4.8–10.8)

## 2018-05-19 RX ORDER — FAMOTIDINE 10 MG/ML
20 INJECTION INTRAVENOUS DAILY
Qty: 0 | Refills: 0 | Status: DISCONTINUED | OUTPATIENT
Start: 2018-05-19 | End: 2018-05-25

## 2018-05-19 RX ORDER — FUROSEMIDE 40 MG
1 TABLET ORAL
Qty: 0 | Refills: 0 | COMMUNITY

## 2018-05-19 RX ORDER — FAMOTIDINE 10 MG/ML
5 INJECTION INTRAVENOUS
Qty: 0 | Refills: 0 | COMMUNITY

## 2018-05-19 RX ORDER — HEPARIN SODIUM 5000 [USP'U]/ML
5000 INJECTION INTRAVENOUS; SUBCUTANEOUS EVERY 8 HOURS
Qty: 0 | Refills: 0 | Status: DISCONTINUED | OUTPATIENT
Start: 2018-05-19 | End: 2018-05-21

## 2018-05-19 RX ORDER — FAMOTIDINE 10 MG/ML
1 INJECTION INTRAVENOUS
Qty: 0 | Refills: 0 | COMMUNITY

## 2018-05-19 RX ORDER — FUROSEMIDE 40 MG
60 TABLET ORAL ONCE
Qty: 0 | Refills: 0 | Status: COMPLETED | OUTPATIENT
Start: 2018-05-19 | End: 2018-05-19

## 2018-05-19 RX ADMIN — Medication 60 MILLIGRAM(S): at 19:52

## 2018-05-19 NOTE — ED ADULT NURSE NOTE - PMH
Gar esophagus    CAD (coronary artery disease)    CHF (congestive heart failure)    DM (diabetes mellitus)    Endarteritis    MI (myocardial infarction)    Pacemaker    Stented coronary artery

## 2018-05-19 NOTE — CONSULT NOTE ADULT - ASSESSMENT
CHF exacerbation could be secondary to V pacing patient mentioned malfunctioning lead ?  the patient is not in BIV mode   maybe related to ADAN ?  CAD sp CABG LIMA SVG to OM patent SVG to PDA has descrete thrombotic lesion on 2014 cath   LV systolic Dysfunction ischemic cardiomyopathy ef 275   ZAC on CKD Creatinine increase from 1.52 to 2.6  DM     Plan:  Iv lasix 60mg q 24   check IO daily weight  Nephrology consultation  EPS consultation for device interrogation  and possible generatir change   medication reconciliation   Repeat CXR in AM CHF exacerbation   CAD sp CABG LIMA SVG to OM patent SVG to PDA has descrete thrombotic lesion on 2014 cath   LV systolic Dysfunction ischemic cardiomyopathy ef 20   ZAC on CKD Creatinine increase from 1.52 to 2.6  DM     Plan:  Iv lasix 60mg q 12  low dose bb, acei   check IO daily weight  Nephrology consultation  EPS consultation for device interrogation   medication reconciliation   Repeat CXR in AM

## 2018-05-19 NOTE — H&P ADULT - NSHPLABSRESULTS_GEN_ALL_CORE
13.5   7.95  )-----------( 279      ( 19 May 2018 15:45 )             43.5     136  |  97<L>  |  57<H>  ----------------------------<  111<H>  5.7<H>   |  25  |  2.1<H>    Ca    8.0<L>      19 May 2018 15:45    TPro  5.0<L>  /  Alb  2.7<L>  /  TBili  0.5  /  DBili  x   /  AST  42<H>  /  ALT  14  /  AlkPhos  96  05-19          PT/INR - ( 19 May 2018 15:45 )   PT: 12.80 sec;   INR: 1.18 ratio         PTT - ( 19 May 2018 15:45 )  PTT:29.8 sec    Lactate Trend  05-19 @ 15:45 Lactate:1.6     CARDIAC MARKERS ( 19 May 2018 15:45 )  x     / 0.09 ng/mL / 192 U/L / x     / 7.7 ng/mL    Serum Pro-Brain Natriuretic Peptide (05.19.18 @ 15:45)    Serum Pro-Brain Natriuretic Peptide: 53101 pg/mL    EKG: paced rhythm, 95BPM, PVCs    Chest Xray: cardiomegaly, pulmonary vascular congestion and R pleural effusion, ?trace left pleural effusion    2d Echo 1/19/15 EF 10%  MUGA 2/9/15 EF 27%

## 2018-05-19 NOTE — H&P ADULT - PMH
Gar esophagus    CAD (coronary artery disease)    CHF (congestive heart failure)    DM (diabetes mellitus)    Endarteritis    MI (myocardial infarction)    Pacemaker    Raynauds phenomenon    Stented coronary artery

## 2018-05-19 NOTE — H&P ADULT - ASSESSMENT
78 yo male with HFrEF s/p AICD/PPM, CAD s/p MI and CABG 2001, Gar's esophagus, Raynaud's PAD/PVD s/p L BKA presents with gradually worsening FRIAS for approximately 1 week. Admitted for CHF exacerbation, AICD battery end life and ZAC    1. CHF exacerbation, less likely ACS  MUGA scan 2015 revealed EF 27%  will obtain repeat 2d echo, trend CE  BNP >00021  Patient takes lasix 80 every other day at home  will give 1 dose of 60 IV and re-evaluate, monitor daily weights, Is and Os  f/u AM cxray  f/u cardiology evaluation with Dr Paz    2. AICD battery end of life + malfunctioning lead, as per patient  will obtain EP eval with Dr Sosa  monitor on Tele    3. ZAC on CKD versus progression of CKD or cardiorenal syndrome  baseline 1.4-1.5 in 2017  check urine lytes, Urine Creat, Renal bladder US and daily BMPs  consider renal eval if renal function continues to worsen    4. GI ppx - patient takes up to 100 mg of pepcid daily  advised patient that this is too high of a dose, especially with his renal fuction, will give 20 daily    5. DVT ppx - heparin 78 yo male with HFrEF s/p AICD/PPM, CAD s/p MI and CABG 2001, Gar's esophagus, Raynaud's PAD/PVD s/p L BKA presents with gradually worsening FRIAS for approximately 1 week. Admitted for CHF exacerbation, AICD battery end life and ZAC    1. CHF exacerbation, less likely ACS  MUGA scan 2015 revealed EF 27%  will obtain repeat 2d echo, trend CE  BNP >72793  Patient takes lasix 80 every other day at home  will give 1 dose of 60 IV and re-evaluate, monitor daily weights, Is and Os  f/u AM cxray  f/u cardiology evaluation with Dr Paz    2. AICD battery end of life + malfunctioning lead, as per patient  will obtain EP eval with Dr Sosa  monitor on Tele    3. ZAC on CKD versus progression of CKD or cardiorenal syndrome  baseline 1.4-1.5 in 2017  check urine lytes, Urine Creat,   daily BMPs  Renal bladder US if renal function worsens  consider renal eval if renal function continues to worsen    4. GI ppx - patient takes up to 100 mg of pepcid daily  advised patient that this is too high of a dose, especially with his renal fuction, will give 20 daily    5. DVT ppx - heparin

## 2018-05-19 NOTE — H&P ADULT - NSHPPHYSICALEXAM_GEN_ALL_CORE
T(C): 35.8 (05-19-18 @ 16:48), Max: 35.8 (05-19-18 @ 16:48)  HR: 75 (05-19-18 @ 16:48) (75 - 95)  BP: 105/57 (05-19-18 @ 16:48) (105/57 - 118/61)  RR: 17 (05-19-18 @ 16:48) (17 - 18)  SpO2: 100% (05-19-18 @ 16:48) (96% - 100%)    PHYSICAL EXAM:  GENERAL: NAD, speaking in full sentences  HEAD:  Atraumatic, Normocephalic  EYES: EOMI, PERRLA, conjunctiva and sclera clear  ENT: No tonsillar exudate, swelling or erythema.  NECK: Supple, + JVD  CHEST/LUNG: bibasilar crackles  HEART: Regular rate and rhythm   ABDOMEN: Soft, Nontender, Nondistended; Bowel sounds present  EXTREMITIES:  Periferal cyanosis, mild pitting edema RLE, s/pBKA of LLE  PSYCH: AAOx3  NEUROLOGY: non-focal

## 2018-05-19 NOTE — CONSULT NOTE ADULT - SUBJECTIVE AND OBJECTIVE BOX
CHIEF COMPLAINT:Patient is a 77y old  Male who presents with a chief complaint of FRIAS x1 week (19 May 2018 18:21)    HISTORY OF PRESENT ILLNESS:  HPI:  78 yo male with HFrEF s/p AICD/PPM, CAD s/p MI and CABG 2001, Gar's esophagus, Raynaud's PAD/PVD s/p L BKA presents with gradually worsening FRIAS for approximately 1 week. Patient states that he feels fine at rest, however becomes SOB when moving or changing positions. He cannot walk a block and it takes him a very long time to walk up a few steps in his house. He denies any CP, palpitations, cough, dizziness or lightheadedness. Of note, patient saw his EP Dr Sosa a few months ago, and was told that his AICD battery is nearing end of life and that one of the leads is not working properly. In the ED, he was found to be in fluid overload with a BNP 04372, mildly elevated troponins and ZAC. He did not receive any medications    Patient states that he is prescribed Lasix 80, which he takes every other day, last taken 2 days ago (19 May 2018 18:21)    PAST MEDICAL & SURGICAL HISTORY:  Raynauds phenomenon  Endarteritis  CAD (coronary artery disease)  Stented coronary artery  MI (myocardial infarction)  Pacemaker  DM (diabetes mellitus)  Gar esophagus  CHF (congestive heart failure)  S/P CABG (coronary artery bypass graft)  S/P BKA (below knee amputation), left    FAMILY HISTORY:  No pertinent family history in first degree relatives    Allergies    Allergy Status Unknown    Intolerances    	  Home Medications:  famotidine 20 mg oral tablet: 5 tab(s) orally once a day, As Needed (19 May 2018 18:32)  Lasix 80 mg oral tablet: 1 tab(s) orally every other day (at bedtime) (19 May 2018 18:32)    MEDICATIONS  (STANDING):  famotidine    Tablet 20 milliGRAM(s) Oral daily  heparin  Injectable 5000 Unit(s) SubCutaneous every 8 hours    MEDICATIONS  (PRN):    SOCIAL HISTORY:    [ x] Non-smoker  [ ] Smoker  [ ] Alcohol  REVIEW OF SYSTEMS:  negative except as above   PHYSICAL EXAM:  T(C): 35.8 (05-19-18 @ 16:48), Max: 35.8 (05-19-18 @ 16:48)  HR: 95 (05-19-18 @ 19:30) (75 - 95)  BP: 127/58 (05-19-18 @ 19:30) (105/57 - 127/58)  RR: 18 (05-19-18 @ 19:30) (17 - 18)  SpO2: 97% (05-19-18 @ 19:30) (96% - 100%)    General Appearance: Normal	  Cardiovascular: Normal S1 S2, No JVD,  No edema  Respiratory:bibasilar crackles   Psychiatry: A & O x 3, Mood & affect appropriate  Gastrointestinal:  Soft, Non-tender  Skin: No rashes, No ecchymoses, No cyanosis	  Neurologic: Non-focal  Extremities:L bka     LABS:	 	                        13.5   7.95  )-----------( 279      ( 19 May 2018 15:45 )             43.5     05-19    138  |  98  |  62<HH>  ----------------------------<  154<H>  4.1   |  28  |  2.6<H>  05-19    136  |  97<L>  |  57<H>  ----------------------------<  111<H>  5.7<H>   |  25  |  2.1<H>    Ca    7.8<L>      19 May 2018 21:04  Ca    8.0<L>      19 May 2018 15:45    TPro  5.0<L>  /  Alb  2.7<L>  /  TBili  0.5  /  DBili  x   /  AST  42<H>  /  ALT  14  /  AlkPhos  96  05-19    proBNP: Serum Pro-Brain Natriuretic Peptide: 67956 pg/mL (05-19 @ 15:45)    Lipid Profile:   HgA1c:   TSH:   CARDIAC MARKERS:  CARDIAC MARKERS ( 19 May 2018 21:04 )   U/L / CKMB7.3 ng/mL / Troponin T0.10 ng/mL /  CARDIAC MARKERS ( 19 May 2018 15:45 )   U/L / CKMB7.7 ng/mL / Troponin T0.09 ng/mL /      TELEMETRY EVENTS: 	    ECG:  	< from: 12 Lead ECG (05.19.18 @ 14:37) >  Diagnosis Line Ventricular-paced rhythm with occasional Premature ventricular complexes  Abnormal ECG    Confirmed by ROBERTO GONZALES MD ([id number]) on 5/19/2018 5:45:04 PM    < end of copied text >    RADIOLOGY:  OTHER: 	  PREVIOUS DIAGNOSTIC TESTING:    [ ] Echocardiogram:2015  Summary   Clinical question: CELLULITIS.   Left ventricle: The ventricle was moderately dilated. Systolic function was   severely reduced. Ejection fraction was estimated to be 10 %. There was   diffuse hypokinesis.   Aortic valve: There was mild to moderate regurgitation.   Left atrium: The atrium was moderately to markedly dilated.   Pulmonic valve: There was trivial regurgitation.   Pulmonary arteries: Systolic pressure was moderately to markedly increased.   Estimated peak pressure was 61 mmHg.   Tricuspid valve: There was mild to moderate regurgitation.   Inferior vena cava, hepatic veins: The inferior vena cava was mildly dilated.   The respirophasic change in diameter was less than 50%.       [ ]  Catheterization:  CORONARY CIRCULATION: The coronary circulation is right dominant. There was   significant 3-vessel coronary artery disease (LAD, RCA, and circumflex). Left   main: The vessel was medium sized. Angiography showed mild atherosclerosis   with no flow limiting lesions. Proximal LAD: There was a 100 % stenosis.   Proximal circumflex: There was a 100 % stenosis. Proximal RCA: There was a   100 % stenosis. Graft to the mid LAD: The graft was a LIMA. Graft angiography   showed no evidence of disease. Graft to the 1st obtuse marginal: The graft   was a saphenous vein graft from the aorta. Graft angiography showed no   evidence of disease. Graft to the RPDA: The graft was a saphenous vein graft   from the aorta. Graft angiography showed a single discrete thrombotic lesion   in the distal third of the vessel. 		    MUGA 2015  Impression:   1. Left ventricular ejection fraction of 27%, significantly decreased from 44% on July 26, 2011. (Ventriculographic ejection fraction 30% of 3/11/14; echocardiographic estimated ejection fraction 10% on 1/19/15).   2. The right and left ventricles are dilated with global hypokinesis CHIEF COMPLAINT:Patient is a 77y old  Male who presents with a chief complaint of FRIAS x1 week (19 May 2018 18:21)    HISTORY OF PRESENT ILLNESS:  HPI:  78 yo male with HFrEF s/p AICD/PPM, CAD s/p MI and CABG 2001, Gar's esophagus, Raynaud's PAD/PVD s/p L BKA presents with gradually worsening FRIAS for approximately 1 week. Patient states that he feels fine at rest, however becomes SOB when moving or changing positions. He cannot walk a block and it takes him a very long time to walk up a few steps in his house. He denies any CP, palpitations, cough, dizziness or lightheadedness. Of note, patient saw his EP Dr Sosa a few months ago, and was told that his AICD battery is nearing end of life and that one of the leads is not working properly. In the ED, he was found to be in fluid overload with a BNP 24672, mildly elevated troponins and ZAC. He did not receive any medications    Patient states that he is prescribed Lasix 80, which he takes every other day, last taken 2 days ago (19 May 2018 18:21)    PAST MEDICAL & SURGICAL HISTORY:  Raynauds phenomenon  Endarteritis  CAD (coronary artery disease)  Stented coronary artery  MI (myocardial infarction)  Pacemaker  DM (diabetes mellitus)  Agr esophagus  CHF (congestive heart failure)  S/P CABG (coronary artery bypass graft)  S/P BKA (below knee amputation), left    FAMILY HISTORY:  No pertinent family history in first degree relatives    Allergies    Allergy Status Unknown      Home Medications:  famotidine 20 mg oral tablet: 5 tab(s) orally once a day, As Needed (19 May 2018 18:32)  Lasix 80 mg oral tablet: 1 tab(s) orally every other day (at bedtime) (19 May 2018 18:32)    MEDICATIONS  (STANDING):  famotidine    Tablet 20 milliGRAM(s) Oral daily  heparin  Injectable 5000 Unit(s) SubCutaneous every 8 hours    MEDICATIONS  (PRN):    SOCIAL HISTORY:    [ x] Non-smoker  [ ] Smoker  [ ] Alcohol  REVIEW OF SYSTEMS:  negative except as above   PHYSICAL EXAM:  T(C): 35.8 (05-19-18 @ 16:48), Max: 35.8 (05-19-18 @ 16:48)  HR: 95 (05-19-18 @ 19:30) (75 - 95)  BP: 127/58 (05-19-18 @ 19:30) (105/57 - 127/58)  RR: 18 (05-19-18 @ 19:30) (17 - 18)  SpO2: 97% (05-19-18 @ 19:30) (96% - 100%)    General Appearance: Normal	  Cardiovascular: Normal S1 S2, No JVD,  No edema  Respiratory:bibasilar crackles   Psychiatry: A & O x 3, Mood & affect appropriate  Gastrointestinal:  Soft, Non-tender  Skin: No rashes, No ecchymoses, No cyanosis	  Neurologic: Non-focal  Extremities:L bka     LABS:	 	                        13.5   7.95  )-----------( 279      ( 19 May 2018 15:45 )             43.5     05-19    138  |  98  |  62<HH>  ----------------------------<  154<H>  4.1   |  28  |  2.6<H>  05-19    136  |  97<L>  |  57<H>  ----------------------------<  111<H>  5.7<H>   |  25  |  2.1<H>    Ca    7.8<L>      19 May 2018 21:04  Ca    8.0<L>      19 May 2018 15:45    TPro  5.0<L>  /  Alb  2.7<L>  /  TBili  0.5  /  DBili  x   /  AST  42<H>  /  ALT  14  /  AlkPhos  96  05-19    proBNP: Serum Pro-Brain Natriuretic Peptide: 27448 pg/mL (05-19 @ 15:45)    Lipid Profile:   HgA1c:   TSH:   CARDIAC MARKERS:  CARDIAC MARKERS ( 19 May 2018 21:04 )   U/L / CKMB7.3 ng/mL / Troponin T0.10 ng/mL /  CARDIAC MARKERS ( 19 May 2018 15:45 )   U/L / CKMB7.7 ng/mL / Troponin T0.09 ng/mL /      TELEMETRY EVENTS: 	    ECG:  	< from: 12 Lead ECG (05.19.18 @ 14:37) >  Diagnosis Line Ventricular-paced rhythm with occasional Premature ventricular complexes  Abnormal ECG    Confirmed by ROBERTO GONZALES MD ([id number]) on 5/19/2018 5:45:04 PM    < end of copied text >    RADIOLOGY:  OTHER: 	  PREVIOUS DIAGNOSTIC TESTING:    [ ] Echocardiogram:2015  Summary   Clinical question: CELLULITIS.   Left ventricle: The ventricle was moderately dilated. Systolic function was   severely reduced. Ejection fraction was estimated to be 10 %. There was   diffuse hypokinesis.   Aortic valve: There was mild to moderate regurgitation.   Left atrium: The atrium was moderately to markedly dilated.   Pulmonic valve: There was trivial regurgitation.   Pulmonary arteries: Systolic pressure was moderately to markedly increased.   Estimated peak pressure was 61 mmHg.   Tricuspid valve: There was mild to moderate regurgitation.   Inferior vena cava, hepatic veins: The inferior vena cava was mildly dilated.   The respirophasic change in diameter was less than 50%.       [ ]  Catheterization:  CORONARY CIRCULATION: The coronary circulation is right dominant. There was   significant 3-vessel coronary artery disease (LAD, RCA, and circumflex). Left   main: The vessel was medium sized. Angiography showed mild atherosclerosis   with no flow limiting lesions. Proximal LAD: There was a 100 % stenosis.   Proximal circumflex: There was a 100 % stenosis. Proximal RCA: There was a   100 % stenosis. Graft to the mid LAD: The graft was a LIMA. Graft angiography   showed no evidence of disease. Graft to the 1st obtuse marginal: The graft   was a saphenous vein graft from the aorta. Graft angiography showed no   evidence of disease. Graft to the RPDA: The graft was a saphenous vein graft   from the aorta. Graft angiography showed a single discrete thrombotic lesion   in the distal third of the vessel. 		    MUGA 2015  Impression:   1. Left ventricular ejection fraction of 27%, significantly decreased from 44% on July 26, 2011. (Ventriculographic ejection fraction 30% of 3/11/14; echocardiographic estimated ejection fraction 10% on 1/19/15).   2. The right and left ventricles are dilated with global hypokinesis

## 2018-05-19 NOTE — ED PROVIDER NOTE - CARE PLAN
Principal Discharge DX:	Pleural effusion  Secondary Diagnosis:	CHF (congestive heart failure)  Secondary Diagnosis:	AICD battery failure Principal Discharge DX:	Pleural effusion  Secondary Diagnosis:	CHF (congestive heart failure)  Secondary Diagnosis:	Elevated troponin

## 2018-05-19 NOTE — H&P ADULT - ATTENDING COMMENTS
76 yo male with HFrEF s/p AICD/PPM, CAD s/p MI   and CABG 2001, Gar's esophagus, Raynaud's PAD/PVD s/p L BKA presents with gradually worsening FRIAS for approximately 1 week. Patient states that he feels fine at rest, however becomes SOB when moving or changing positions   AICD battery end of life + malfunctioning lead, as per patient  will obtain EP eval with Dr Sosa    Pager No. [phone]

## 2018-05-19 NOTE — H&P ADULT - HISTORY OF PRESENT ILLNESS
76 yo male with HFrEF s/p AICD/PPM, CAD s/p MI and CABG 2001, Gar's esophagus, Raynaud's PAD/PVD s/p L BKA presents with gradually worsening FRIAS for approximately 1 week. Patient states that he feels fine at rest, however becomes SOB when moving or changing positions. He cannot walk a block and it takes him a very long time to walk up a few steps in his house. He denies any CP, palpitations, cough, dizziness or lightheadedness. Of note, patient saw his EP Dr Sosa a few months ago, and was told that his AICD battery is nearing end of life and that one of the leads is not working properly. In the ED, he was found to be in fluid overload with a BNP 17831, mildly elevated troponins and ZAC. He did not receive any medications    Patient states that he is prescribed Lasix 80, which he takes every other day, last taken 2 days ago

## 2018-05-19 NOTE — ED PROVIDER NOTE - PROGRESS NOTE DETAILS
Called [employer] to have AICD interrogated, representative is [place], at [phone], will come evaluate the device [email or web address] representative Ana Maria unable to make it today her phone number is [phone], will try to make it to the hospital tomorrow. Spoke to Dr. Orr, cardio fellow, who would like patient to be admitted to Tele, and alyssa repeated

## 2018-05-19 NOTE — ED PROVIDER NOTE - ATTENDING CONTRIBUTION TO CARE
Pt is a 76yo male with hx of rib fx who comes in for progressive SOB, worse on exertion, for the last few days.  No CP or syncope.  He notes his HR has dropped below 70 a few times and believes his AICD is near end-of-life.    Exam: decreased breath sounds at R base, otherwise clear, cap refill <2s, 2+ radial pulses, no RLE edema, no calf tenderness, L BKA  Imp: pleural effusion, r/o ACS/AICD malfunction  Plan: EKG, trop, XR chest

## 2018-05-20 DIAGNOSIS — T82.118A BREAKDOWN (MECHANICAL) OF OTHER CARDIAC ELECTRONIC DEVICE, INITIAL ENCOUNTER: ICD-10-CM

## 2018-05-20 DIAGNOSIS — I50.9 HEART FAILURE, UNSPECIFIED: ICD-10-CM

## 2018-05-20 LAB
CK MB CFR SERPL CALC: 7.7 NG/ML — HIGH (ref 0.6–6.3)
CK SERPL-CCNC: 196 U/L — SIGNIFICANT CHANGE UP (ref 0–225)
TROPONIN T SERPL-MCNC: 0.11 NG/ML — CRITICAL HIGH

## 2018-05-20 RX ORDER — ASPIRIN/CALCIUM CARB/MAGNESIUM 324 MG
81 TABLET ORAL DAILY
Qty: 0 | Refills: 0 | Status: DISCONTINUED | OUTPATIENT
Start: 2018-05-20 | End: 2018-05-25

## 2018-05-20 RX ORDER — FUROSEMIDE 40 MG
60 TABLET ORAL
Qty: 0 | Refills: 0 | Status: DISCONTINUED | OUTPATIENT
Start: 2018-05-20 | End: 2018-05-25

## 2018-05-20 RX ORDER — FUROSEMIDE 40 MG
60 TABLET ORAL DAILY
Qty: 0 | Refills: 0 | Status: DISCONTINUED | OUTPATIENT
Start: 2018-05-20 | End: 2018-05-20

## 2018-05-20 RX ORDER — METOPROLOL TARTRATE 50 MG
25 TABLET ORAL
Qty: 0 | Refills: 0 | Status: DISCONTINUED | OUTPATIENT
Start: 2018-05-20 | End: 2018-05-25

## 2018-05-20 RX ADMIN — Medication 60 MILLIGRAM(S): at 05:49

## 2018-05-20 RX ADMIN — Medication 60 MILLIGRAM(S): at 18:16

## 2018-05-20 RX ADMIN — FAMOTIDINE 20 MILLIGRAM(S): 10 INJECTION INTRAVENOUS at 12:52

## 2018-05-20 RX ADMIN — Medication 81 MILLIGRAM(S): at 12:52

## 2018-05-20 RX ADMIN — Medication 25 MILLIGRAM(S): at 05:49

## 2018-05-20 NOTE — CHART NOTE - NSCHARTNOTEFT_GEN_A_CORE
medications reconciled with patient.  He insists he does not take any other medications aside from his lasix and pepcid at home.  He states he used to take some other medications in the past such as beta blockers but it was discontinued due to hypotension.

## 2018-05-20 NOTE — ED ADULT NURSE REASSESSMENT NOTE - NS ED NURSE REASSESS COMMENT FT1
Pt has two episodes of V tach, pt asymptomatic at this time. MD Bonifacio made aware. No new orders, MD stated to continue to monitor.

## 2018-05-20 NOTE — ED ADULT NURSE REASSESSMENT NOTE - NS ED NURSE REASSESS COMMENT FT1
Pt on monitor, afib present on monitor with episodes of tachycardia, pt asymptomatic at this time. MD Valdovinos made aware, will order EKG. Will continue to monitor.

## 2018-05-20 NOTE — CONSULT NOTE ADULT - ASSESSMENT
76 yo male with HFrEF s/p AICD/PPM, CAD s/p MI and CABG 2001, Gar's esophagus, Raynaud's PAD/PVD s/p L BKA presenting with SOB / CHF exacerbation     ·	ZAC on CKD 3(patient said his baseline creatinine is 1.5) rule out CRS type 2/ diabetic nephropathy  ·	Continue lasix IV current (60 mg q24h)  ·	strict I and O  ·	renal bladder sono rule out hydro  ·	check UA urine urea urine protein creat ratio   ·	Avoid nephrotoxins avoid hypotension   ·	ASPVD sp left BKA   ·	CAD/ CHF appreciate cardio notes

## 2018-05-20 NOTE — PROGRESS NOTE ADULT - SUBJECTIVE AND OBJECTIVE BOX
GASTON AZEVEDO, male, 77y ([birth year]),   MRN-[id number]  Admit Date: 05-19 (1d)  CC:      HPI:  76 yo male with HFrEF s/p AICD/PPM, CAD s/p MI and CABG 2001, Gar's esophagus, Raynaud's PAD/PVD s/p L BKA presents with gradually worsening FRIAS for approximately 1 week. Patient states that he feels fine at rest, however becomes SOB when moving or changing positions. He cannot walk a block and it takes him a very long time to walk up a few steps in his house. He denies any CP, palpitations, cough, dizziness or lightheadedness. Of note, patient saw his EP Dr Sosa a few months ago, and was told that his AICD battery is nearing end of life and that one of the leads is not working properly. In the ED, he was found to be in fluid overload with a BNP 39155, mildly elevated troponins and ZAC. He did not receive any medications    Patient states that he is prescribed Lasix 80, which he takes every other day, last taken 2 days ago (19 May 2018 18:21)      INTERVAL HISTORY:  No acute events overnight. No complaints at this time.    VS:  T(F): 96.8 (05-20 @ 08:50), Max: 96.8 (05-20 @ 08:50)  HR: 80 (05-20 @ 08:50)  BP: 100/55 (05-20 @ 08:50)  RR: 18 (05-20 @ 08:50)  SpO2: 97% (05-20 @ 08:50)  CAPILLARY BLOOD GLUCOSE          PE:  General: Not in distress.  Non-toxic appearing.   HEENT: EOMI  Cardio: Regular rate and rhythm, S1, S2, no murmur  Pulm: B/L BS.  crackles  Abdomen: Soft, non-tender, non-distended. Normoactive bowel sounds  Extremities: pitting edema RLE, s/pBKA of LLE  Neuro: A&O x3. No focal deficits      LABS/RAD/MICRO:  (05-19)  WBC: 7.95 K/uL  HGB: 13.5 g/dL  PLT: 279 K/uL  ---  MCV: 81.3 fL    (05-19)    138  |  98  |  62<HH>  ----------------------------<  154<H>  4.1   |  28  |  2.6<H>    Ca    7.8<L>      05-19    (05-19)  TPro  5.0<L>  /  Alb  2.7<L>  /  TBili  0.5  /  DBili  x   /  AST  42<H>  /  ALT  14  /  AlkPhos  96      (05-19)  PT: 12.80  PTT:29.8  INR:1.18          Current Medications:  MEDICATIONS  (STANDING):  aspirin  chewable 81 milliGRAM(s) Oral daily  famotidine    Tablet 20 milliGRAM(s) Oral daily  furosemide   Injectable 60 milliGRAM(s) IV Push daily  heparin  Injectable 5000 Unit(s) SubCutaneous every 8 hours  metoprolol tartrate 25 milliGRAM(s) Oral two times a day    MEDICATIONS  (PRN):      PMH/PSH  PAST MEDICAL & SURGICAL HISTORY:  Raynauds phenomenon  Endarteritis  CAD (coronary artery disease)  Stented coronary artery  MI (myocardial infarction)  Pacemaker  DM (diabetes mellitus)  Gar esophagus  CHF (congestive heart failure)  S/P CABG (coronary artery bypass graft)  S/P BKA (below knee amputation), left

## 2018-05-20 NOTE — CONSULT NOTE ADULT - SUBJECTIVE AND OBJECTIVE BOX
NEPHROLOGY CONSULTATION NOTE    78 yo male with HFrEF s/p AICD/PPM, CAD s/p MI and CABG 2001, Gar's esophagus, Raynaud's PAD/PVD s/p L BKA presents with gradually worsening FRIAS for approximately 1 week. Patient states that he feels fine at rest, however becomes SOB when moving or changing positions. He cannot walk a block and it takes him a very long time to walk up a few steps in his house. He denies any CP, palpitations, cough, dizziness or lightheadedness. Of note, patient saw his EP Dr Sosa a few months ago, and was told that his AICD battery is nearing end of life and that one of the leads is not working properly. In the ED, he was found to be in fluid overload with a BNP 60037, mildly elevated troponins and ZAC. He did not receive any medications      PAST MEDICAL & SURGICAL HISTORY:  Raynauds phenomenon  Endarteritis  CAD (coronary artery disease)  Stented coronary artery  MI (myocardial infarction)  Pacemaker  DM (diabetes mellitus)  Gar esophagus  CHF (congestive heart failure)  S/P CABG (coronary artery bypass graft)  S/P BKA (below knee amputation), left    Allergies:  Allergy Status Unknown    Home Medications Reviewed  Hospital Medications:   MEDICATIONS  (STANDING):  aspirin  chewable 81 milliGRAM(s) Oral daily  famotidine    Tablet 20 milliGRAM(s) Oral daily  furosemide   Injectable 60 milliGRAM(s) IV Push daily  heparin  Injectable 5000 Unit(s) SubCutaneous every 8 hours  metoprolol tartrate 25 milliGRAM(s) Oral two times a day      SOCIAL HISTORY:  Denies ETOH,Smoking,   FAMILY HISTORY:  No pertinent family history in first degree relatives        REVIEW OF SYSTEMS:  CONSTITUTIONAL: No weakness, fevers or chills  EYES/ENT: No visual changes;  No vertigo or throat pain   NECK: No pain or stiffness  RESPIRATORY: No cough, wheezing, hemoptysis; No shortness of breath  CARDIOVASCULAR: No chest pain or palpitations.  GASTROINTESTINAL: No abdominal or epigastric pain. No nausea, vomiting, or hematemesis; No diarrhea or constipation. No melena or hematochezia.  GENITOURINARY: No dysuria, frequency, foamy urine, urinary urgency, incontinence or hematuria  NEUROLOGICAL: No numbness or weakness  SKIN: No itching, burning, rashes, or lesions   VASCULAR: No bilateral lower extremity edema.   All other review of systems is negative unless indicated above.    VITALS:  T(F): 96.8 (05-20-18 @ 08:50), Max: 96.8 (05-20-18 @ 08:50)  HR: 80 (05-20-18 @ 08:50)  BP: 100/55 (05-20-18 @ 08:50)  RR: 18 (05-20-18 @ 08:50)  SpO2: 97% (05-20-18 @ 08:50)    05-19 @ 07:01  -  05-20 @ 07:00  --------------------------------------------------------  IN: 0 mL / OUT: 600 mL / NET: -600 mL            I&O's Detail    19 May 2018 07:01  -  20 May 2018 07:00  --------------------------------------------------------  IN:  Total IN: 0 mL    OUT:    Voided: 600 mL  Total OUT: 600 mL    Total NET: -600 mL        Creatine Kinase, Serum: 196 U/L (05-20-18 @ 06:51)  Creatine Kinase, Serum: 174 U/L (05-19-18 @ 21:04)  Creatine Kinase, Serum: 192 U/L (05-19-18 @ 15:45)      PHYSICAL EXAM:  Constitutional: NAD  HEENT: anicteric sclera, oropharynx clear, MMM  Neck: No JVD  Respiratory: CTAB, no wheezes, rales or rhonchi  Cardiovascular: S1, S2, RRR  Gastrointestinal: BS+, soft, NT/ND  Extremities: No cyanosis or clubbing. No peripheral edema  Neurological: A/O x 3, no focal deficits  Psychiatric: Normal mood, normal affect  : No CVA tenderness. No delcid.   Skin: No rashes  Vascular Access:    LABS:  05-19    138  |  98  |  62<HH>  ----------------------------<  154<H>  4.1   |  28  |  2.6<H>    Ca    7.8<L>      19 May 2018 21:04    TPro  5.0<L>  /  Alb  2.7<L>  /  TBili  0.5  /  DBili      /  AST  42<H>  /  ALT  14  /  AlkPhos  96  05-19    Creatinine Trend: 2.6 <--, 2.1 <--                        13.5   7.95  )-----------( 279      ( 19 May 2018 15:45 )             43.5     Urine Studies:              RADIOLOGY & ADDITIONAL STUDIES:    < from: Transthoracic Echocardiogram (05.20.18 @ 10:13) >   1. Left ventricular ejection fraction, by visual estimation, is <20%.   2. Severely decreased global left ventricular systolic function.   3. Large, fixed thrombus on the apical wall of the left ventricle.   4. Mildly increased left ventricular internal cavity size.   5. Moderately reduced RV systolic function.   6. Mild mitral valve regurgitation.   7. Moderate-severe tricuspid regurgitation.   8. Mild aortic regurgitation.   9. Estimated pulmonary artery systolicpressure is 42.6 mmHg assuming a   right atrial pressure of 15 mmHg, which is consistent with mild pulmonary   hypertension.    < end of copied text > NEPHROLOGY CONSULTATION NOTE    76 yo male with HFrEF s/p AICD/PPM, CAD s/p MI and CABG 2001, Gar's esophagus, Raynaud's PAD/PVD s/p L BKA presents with SOB occurring at minimal activity and then worsening to occur at rest. Denied any chest pain no cough no sputum production no fever no chills . To note also that patient was seen by Dr Fisher few years ago for CKD and he never followed up       PAST MEDICAL & SURGICAL HISTORY:  Raynauds phenomenon  Endarteritis  CAD (coronary artery disease)  Stented coronary artery  MI (myocardial infarction)  Pacemaker  DM (diabetes mellitus)  Gar esophagus  CHF (congestive heart failure)  S/P CABG (coronary artery bypass graft)  S/P BKA (below knee amputation), left    Allergies:  Allergy Status Unknown    Home Medications Reviewed  Home Medications:  famotidine 20 mg oral tablet: 5 tab(s) orally once a day, As Needed (19 May 2018 18:32)  Lasix 80 mg oral tablet: 1 tab(s) orally every other day (at bedtime) (19 May 2018 18:32)    Hospital Medications:   MEDICATIONS  (STANDING):  aspirin  chewable 81 milliGRAM(s) Oral daily  famotidine    Tablet 20 milliGRAM(s) Oral daily  furosemide   Injectable 60 milliGRAM(s) IV Push daily  heparin  Injectable 5000 Unit(s) SubCutaneous every 8 hours  metoprolol tartrate 25 milliGRAM(s) Oral two times a day      SOCIAL HISTORY:  Denies ETOH,Smoking,   FAMILY HISTORY:  No pertinent family history in first degree relatives        REVIEW OF SYSTEMS:  All other review of systems is negative unless indicated above.    VITALS:  T(F): 96.8 (05-20-18 @ 08:50), Max: 96.8 (05-20-18 @ 08:50)  HR: 80 (05-20-18 @ 08:50)  BP: 100/55 (05-20-18 @ 08:50)  RR: 18 (05-20-18 @ 08:50)  SpO2: 97% (05-20-18 @ 08:50)    05-19 @ 07:01  -  05-20 @ 07:00  --------------------------------------------------------  IN: 0 mL / OUT: 600 mL / NET: -600 mL            I&O's Detail    19 May 2018 07:01  -  20 May 2018 07:00  --------------------------------------------------------  IN:  Total IN: 0 mL    OUT:    Voided: 600 mL  Total OUT: 600 mL    Total NET: -600 mL        Creatine Kinase, Serum: 196 U/L (05-20-18 @ 06:51)  Creatine Kinase, Serum: 174 U/L (05-19-18 @ 21:04)  Creatine Kinase, Serum: 192 U/L (05-19-18 @ 15:45)      PHYSICAL EXAM:  Constitutional: NAD  HEENT: anicteric sclera, oropharynx clear, MMM  Neck: No JVD  Respiratory: Crackles at base   Cardiovascular: S1, S2, RRR  Gastrointestinal: BS+, soft, NT/ND  Extremities: No cyanosis or clubbing. No peripheral edema, left BKA   Neurological: A/O x 3, no focal deficits  : No CVA tenderness. No delcid.   Skin: No rashes  Vascular Access:    LABS:  05-19    138  |  98  |  62<HH>  ----------------------------<  154<H>  4.1   |  28  |  2.6<H>    Ca    7.8<L>      19 May 2018 21:04    TPro  5.0<L>  /  Alb  2.7<L>  /  TBili  0.5  /  DBili      /  AST  42<H>  /  ALT  14  /  AlkPhos  96  05-19    Creatinine Trend: 2.6 <--, 2.1 <--                        13.5   7.95  )-----------( 279      ( 19 May 2018 15:45 )             43.5     Urine Studies:              RADIOLOGY & ADDITIONAL STUDIES:    < from: Transthoracic Echocardiogram (05.20.18 @ 10:13) >   1. Left ventricular ejection fraction, by visual estimation, is <20%.   2. Severely decreased global left ventricular systolic function.   3. Large, fixed thrombus on the apical wall of the left ventricle.   4. Mildly increased left ventricular internal cavity size.   5. Moderately reduced RV systolic function.   6. Mild mitral valve regurgitation.   7. Moderate-severe tricuspid regurgitation.   8. Mild aortic regurgitation.   9. Estimated pulmonary artery systolicpressure is 42.6 mmHg assuming a   right atrial pressure of 15 mmHg, which is consistent with mild pulmonary   hypertension.    < end of copied text >

## 2018-05-20 NOTE — PROGRESS NOTE ADULT - ASSESSMENT
76 yo male with HFrEF s/p AICD/PPM, CAD s/p MI and CABG 2001, Gar's esophagus, Raynaud's PAD/PVD s/p L BKA presents with gradually worsening FRIAS for approximately 1 week. Admitted for CHF exacerbation, AICD battery end life and ZAC    CHF exacerbation  AICD battery end of life + malfunctioning lead, per patient  BNP >01434  repeat tte  will obtain repeat 2d echo, trend CE  monitor daily weights, Is and Os  cardiology:  Iv lasix 60mg q 24   check IO daily weight  Nephrology consultation  EPS consultation for device interrogation  and possible generatir change   medication reconciliation   Repeat CXR in AM   eps eval pending      ZAC on CKD versus progression of CKD or cardiorenal syndrome  baseline 1.4-1.5 in 2017  check urine lytes, Urine Creat, daily BMPs  Renal bladder US if renal function worsens  nephro eval pending      GI ppx - pepcid 20 daily  DVT ppx - heparin

## 2018-05-20 NOTE — CONSULT NOTE ADULT - SUBJECTIVE AND OBJECTIVE BOX
CHIEF COMPLAINT:Patient is a 77y old  Male who presents with a chief complaint of FRIAS x1 week (19 May 2018 18:21)    HISTORY OF PRESENT ILLNESS:  HPI:  78 yo male with HFrEF s/p AICD/PPM, CAD s/p MI and CABG 2001, Gar's esophagus, Raynaud's PAD/PVD s/p L BKA presents with gradually worsening FRIAS for approximately 1 week. Patient states that he feels fine at rest, however becomes SOB when moving or changing positions. He cannot walk a block and it takes him a very long time to walk up a few steps in his house. He denies any CP, palpitations, cough, dizziness or lightheadedness. Of note, patient saw his EP Dr Sosa a few months ago, and was told that his AICD battery is nearing end of life and that one of the leads is not working properly. In the ED, he was found to be in fluid overload with a BNP 54751, mildly elevated troponins and ZAC. He did not receive any medications    Patient states that he is prescribed Lasix 80, which he takes every other day, last taken 2 days ago (19 May 2018 18:21)    PAST MEDICAL & SURGICAL HISTORY:  Raynauds phenomenon  Endarteritis  CAD (coronary artery disease)  Stented coronary artery  MI (myocardial infarction)  Pacemaker  DM (diabetes mellitus)  Gar esophagus  CHF (congestive heart failure)  S/P CABG (coronary artery bypass graft)  S/P BKA (below knee amputation), left    FAMILY HISTORY:  No pertinent family history in first degree relatives    Allergies    Allergy Status Unknown      Home Medications:  famotidine 20 mg oral tablet: 5 tab(s) orally once a day, As Needed (19 May 2018 18:32)  Lasix 80 mg oral tablet: 1 tab(s) orally every other day (at bedtime) (19 May 2018 18:32)    MEDICATIONS  (STANDING):  famotidine    Tablet 20 milliGRAM(s) Oral daily  heparin  Injectable 5000 Unit(s) SubCutaneous every 8 hours    MEDICATIONS  (PRN):    SOCIAL HISTORY:    [ x] Non-smoker  [ ] Smoker  [ ] Alcohol  REVIEW OF SYSTEMS:  negative except as above   PHYSICAL EXAM:  T(C): 35.8 (05-19-18 @ 16:48), Max: 35.8 (05-19-18 @ 16:48)  HR: 95 (05-19-18 @ 19:30) (75 - 95)  BP: 127/58 (05-19-18 @ 19:30) (105/57 - 127/58)  RR: 18 (05-19-18 @ 19:30) (17 - 18)  SpO2: 97% (05-19-18 @ 19:30) (96% - 100%)    General Appearance: Normal	  Cardiovascular: Normal S1 S2, No JVD,  No edema  Respiratory:bibasilar crackles   Psychiatry: A & O x 3, Mood & affect appropriate  Gastrointestinal:  Soft, Non-tender  Skin: No rashes, No ecchymoses, No cyanosis	  Neurologic: Non-focal  Extremities:L bka     LABS:	 	                        13.5   7.95  )-----------( 279      ( 19 May 2018 15:45 )             43.5     05-19    138  |  98  |  62<HH>  ----------------------------<  154<H>  4.1   |  28  |  2.6<H>  05-19    136  |  97<L>  |  57<H>  ----------------------------<  111<H>  5.7<H>   |  25  |  2.1<H>    Ca    7.8<L>      19 May 2018 21:04  Ca    8.0<L>      19 May 2018 15:45    TPro  5.0<L>  /  Alb  2.7<L>  /  TBili  0.5  /  DBili  x   /  AST  42<H>  /  ALT  14  /  AlkPhos  96  05-19    proBNP: Serum Pro-Brain Natriuretic Peptide: 62415 pg/mL (05-19 @ 15:45)    Lipid Profile:   HgA1c:   TSH:   CARDIAC MARKERS:  CARDIAC MARKERS ( 19 May 2018 21:04 )   U/L / CKMB7.3 ng/mL / Troponin T0.10 ng/mL /  CARDIAC MARKERS ( 19 May 2018 15:45 )   U/L / CKMB7.7 ng/mL / Troponin T0.09 ng/mL /      TELEMETRY EVENTS: 	    ECG:  	< from: 12 Lead ECG (05.19.18 @ 14:37) >  Diagnosis Line Ventricular-paced rhythm with occasional Premature ventricular complexes  Abnormal ECG    Confirmed by ROBERTO GONZALES MD ([id number]) on 5/19/2018 5:45:04 PM    < end of copied text >    RADIOLOGY:  OTHER: 	  PREVIOUS DIAGNOSTIC TESTING:    [ ] Echocardiogram:2015  Summary   Clinical question: CELLULITIS.   Left ventricle: The ventricle was moderately dilated. Systolic function was   severely reduced. Ejection fraction was estimated to be 10 %. There was   diffuse hypokinesis.   Aortic valve: There was mild to moderate regurgitation.   Left atrium: The atrium was moderately to markedly dilated.   Pulmonic valve: There was trivial regurgitation.   Pulmonary arteries: Systolic pressure was moderately to markedly increased.   Estimated peak pressure was 61 mmHg.   Tricuspid valve: There was mild to moderate regurgitation.   Inferior vena cava, hepatic veins: The inferior vena cava was mildly dilated.   The respirophasic change in diameter was less than 50%.       [ ]  Catheterization:  CORONARY CIRCULATION: The coronary circulation is right dominant. There was   significant 3-vessel coronary artery disease (LAD, RCA, and circumflex). Left   main: The vessel was medium sized. Angiography showed mild atherosclerosis   with no flow limiting lesions. Proximal LAD: There was a 100 % stenosis.   Proximal circumflex: There was a 100 % stenosis. Proximal RCA: There was a   100 % stenosis. Graft to the mid LAD: The graft was a LIMA. Graft angiography   showed no evidence of disease. Graft to the 1st obtuse marginal: The graft   was a saphenous vein graft from the aorta. Graft angiography showed no   evidence of disease. Graft to the RPDA: The graft was a saphenous vein graft   from the aorta. Graft angiography showed a single discrete thrombotic lesion   in the distal third of the vessel. 		    MUGA 2015  Impression:   1. Left ventricular ejection fraction of 27%, significantly decreased from 44% on July 26, 2011. (Ventriculographic ejection fraction 30% of 3/11/14; echocardiographic estimated ejection fraction 10% on 1/19/15).   2. The right and left ventricles are dilated with global hypokinesis

## 2018-05-20 NOTE — ED ADULT NURSE REASSESSMENT NOTE - NS ED NURSE REASSESS COMMENT FT1
Pt had 3 more episodes of VTach, pt asymptomatic. Pt has working AICD, MD Bonifacio made aware, Ekg previously done. MD states to continue to monitor, will have AICD inspected.

## 2018-05-21 LAB
ANION GAP SERPL CALC-SCNC: 13 MMOL/L — SIGNIFICANT CHANGE UP (ref 7–14)
APTT BLD: >200 SEC — CRITICAL HIGH (ref 27–39.2)
BASOPHILS # BLD AUTO: 0.06 K/UL — SIGNIFICANT CHANGE UP (ref 0–0.2)
BASOPHILS NFR BLD AUTO: 0.7 % — SIGNIFICANT CHANGE UP (ref 0–1)
BUN SERPL-MCNC: 68 MG/DL — CRITICAL HIGH (ref 10–20)
CALCIUM SERPL-MCNC: 8 MG/DL — LOW (ref 8.5–10.1)
CHLORIDE SERPL-SCNC: 98 MMOL/L — SIGNIFICANT CHANGE UP (ref 98–110)
CK MB CFR SERPL CALC: 9.8 NG/ML — HIGH (ref 0.6–6.3)
CK SERPL-CCNC: 228 U/L — HIGH (ref 0–225)
CO2 SERPL-SCNC: 29 MMOL/L — SIGNIFICANT CHANGE UP (ref 17–32)
CREAT SERPL-MCNC: 2.2 MG/DL — HIGH (ref 0.7–1.5)
EOSINOPHIL # BLD AUTO: 0.32 K/UL — SIGNIFICANT CHANGE UP (ref 0–0.7)
EOSINOPHIL NFR BLD AUTO: 3.6 % — SIGNIFICANT CHANGE UP (ref 0–8)
GLUCOSE SERPL-MCNC: 179 MG/DL — HIGH (ref 70–99)
HCT VFR BLD CALC: 45.7 % — SIGNIFICANT CHANGE UP (ref 42–52)
HGB BLD-MCNC: 14.1 G/DL — SIGNIFICANT CHANGE UP (ref 14–18)
IMM GRANULOCYTES NFR BLD AUTO: 0.4 % — HIGH (ref 0.1–0.3)
INR BLD: 1.29 RATIO — SIGNIFICANT CHANGE UP (ref 0.65–1.3)
LYMPHOCYTES # BLD AUTO: 0.6 K/UL — LOW (ref 1.2–3.4)
LYMPHOCYTES # BLD AUTO: 6.7 % — LOW (ref 20.5–51.1)
MCHC RBC-ENTMCNC: 24.9 PG — LOW (ref 27–31)
MCHC RBC-ENTMCNC: 30.9 G/DL — LOW (ref 32–37)
MCV RBC AUTO: 80.7 FL — SIGNIFICANT CHANGE UP (ref 80–94)
MONOCYTES # BLD AUTO: 1.19 K/UL — HIGH (ref 0.1–0.6)
MONOCYTES NFR BLD AUTO: 13.4 % — HIGH (ref 1.7–9.3)
NEUTROPHILS # BLD AUTO: 6.7 K/UL — HIGH (ref 1.4–6.5)
NEUTROPHILS NFR BLD AUTO: 75.2 % — SIGNIFICANT CHANGE UP (ref 42.2–75.2)
PLATELET # BLD AUTO: 290 K/UL — SIGNIFICANT CHANGE UP (ref 130–400)
POTASSIUM SERPL-MCNC: 4.2 MMOL/L — SIGNIFICANT CHANGE UP (ref 3.5–5)
POTASSIUM SERPL-SCNC: 4.2 MMOL/L — SIGNIFICANT CHANGE UP (ref 3.5–5)
PROTHROM AB SERPL-ACNC: 14 SEC — HIGH (ref 9.95–12.87)
RBC # BLD: 5.66 M/UL — SIGNIFICANT CHANGE UP (ref 4.7–6.1)
RBC # FLD: 18.2 % — HIGH (ref 11.5–14.5)
SODIUM SERPL-SCNC: 140 MMOL/L — SIGNIFICANT CHANGE UP (ref 135–146)
TROPONIN T SERPL-MCNC: 0.12 NG/ML — CRITICAL HIGH
WBC # BLD: 8.91 K/UL — SIGNIFICANT CHANGE UP (ref 4.8–10.8)
WBC # FLD AUTO: 8.91 K/UL — SIGNIFICANT CHANGE UP (ref 4.8–10.8)

## 2018-05-21 RX ORDER — HEPARIN SODIUM 5000 [USP'U]/ML
1500 INJECTION INTRAVENOUS; SUBCUTANEOUS
Qty: 25000 | Refills: 0 | Status: DISCONTINUED | OUTPATIENT
Start: 2018-05-21 | End: 2018-05-21

## 2018-05-21 RX ORDER — WARFARIN SODIUM 2.5 MG/1
5 TABLET ORAL AT BEDTIME
Qty: 0 | Refills: 0 | Status: DISCONTINUED | OUTPATIENT
Start: 2018-05-21 | End: 2018-05-21

## 2018-05-21 RX ADMIN — Medication 60 MILLIGRAM(S): at 06:26

## 2018-05-21 RX ADMIN — Medication 60 MILLIGRAM(S): at 20:30

## 2018-05-21 RX ADMIN — Medication 81 MILLIGRAM(S): at 11:52

## 2018-05-21 RX ADMIN — FAMOTIDINE 20 MILLIGRAM(S): 10 INJECTION INTRAVENOUS at 11:52

## 2018-05-21 RX ADMIN — HEPARIN SODIUM 15 UNIT(S)/HR: 5000 INJECTION INTRAVENOUS; SUBCUTANEOUS at 14:30

## 2018-05-21 NOTE — PROGRESS NOTE ADULT - SUBJECTIVE AND OBJECTIVE BOX
LENGTH OF HOSPITAL STAY: 2d    CHIEF COMPLAINT:   Patient is a 77y old  Male who presents with a chief complaint of FRIAS x1 week (19 May 2018 18:21)    OVERNIGHT EVENTS:    pt continues to have SOB , but reports its better than before. 2D Echo done on 5/20 showed decreased EF to <20% and large fixed LV thrombus.        HISTORY OF PRESENTING ILLNESS:    HPI:  76 yo male with HFrEF s/p AICD/PPM, CAD s/p MI and CABG 2001, Gar's esophagus, Raynaud's PAD/PVD s/p L BKA presents with gradually worsening FRIAS for approximately 1 week. Patient states that he feels fine at rest, however becomes SOB when moving or changing positions. He cannot walk a block and it takes him a very long time to walk up a few steps in his house. He denies any CP, palpitations, cough, dizziness or lightheadedness. Of note, patient saw his EP Dr Sosa a few months ago, and was told that his AICD battery is nearing end of life and that one of the leads is not working properly. In the ED, he was found to be in fluid overload with a BNP 60855, mildly elevated troponins and ZAC. He did not receive any medications    Patient states that he is prescribed Lasix 80, which he takes every other day, last taken 2 days ago (19 May 2018 18:21)    PAST MEDICAL & SURGICAL HISTORY  PAST MEDICAL & SURGICAL HISTORY:  Raynauds phenomenon  Endarteritis  CAD (coronary artery disease)  Stented coronary artery  MI (myocardial infarction)  Pacemaker  DM (diabetes mellitus)  Gar esophagus  CHF (congestive heart failure)  S/P CABG (coronary artery bypass graft)  S/P BKA (below knee amputation), left    SOCIAL HISTORY:    ALLERGIES:  Allergy Status Unknown    MEDICATIONS:  STANDING MEDICATIONS  aspirin  chewable 81 milliGRAM(s) Oral daily  famotidine    Tablet 20 milliGRAM(s) Oral daily  furosemide   Injectable 60 milliGRAM(s) IV Push two times a day  heparin  Infusion 1500 Unit(s)/Hr IV Continuous <Continuous>  metoprolol tartrate 25 milliGRAM(s) Oral two times a day  warfarin 5 milliGRAM(s) Oral at bedtime    PRN MEDICATIONS    VITALS:   T(F): 98  HR: 77  BP: 118/66  RR: 18  SpO2: 99%    LABS:                        14.1   8.91  )-----------( 290      ( 21 May 2018 09:20 )             45.7     05-21    140  |  98  |  68<HH>  ----------------------------<  179<H>  4.2   |  29  |  2.2<H>    Ca    8.0<L>      21 May 2018 09:20            Creatine Kinase, Serum: 228 U/L <H> (05-21-18 @ 09:20)  Troponin T, Serum: 0.12 ng/mL <HH> (05-21-18 @ 09:20)      CARDIAC MARKERS ( 21 May 2018 09:20 )  x     / 0.12 ng/mL / 228 U/L / x     / 9.8 ng/mL  CARDIAC MARKERS ( 20 May 2018 06:51 )  x     / 0.11 ng/mL / 196 U/L / x     / 7.7 ng/mL  CARDIAC MARKERS ( 19 May 2018 21:04 )  x     / 0.10 ng/mL / 174 U/L / x     / 7.3 ng/mL      RADIOLOGY:    PHYSICAL EXAM:  GEN: No acute distress  HEENT: nc/at perrla  LUNGS: Clear to auscultation bilaterally   HEART: S1/S2 present. RRR.   ABD: Soft, non-tender, non-distended. Bowel sounds present  EXT: left bka , no erythema or swelling at the stump site.  NEURO: AAOX3

## 2018-05-21 NOTE — PROGRESS NOTE ADULT - ASSESSMENT
76 yo male with HFrEF s/p AICD/PPM, CAD s/p MI and CABG 2001, Gar's esophagus, Raynaud's PAD/PVD s/p L BKA presenting with SOB / CHF exacerbation     ·	ZAC on CKD 3(patient said his baseline creatinine is 1.5) rule out CRS type 2/ diabetic nephropathy  ·	Continue lasix IV current (60 mg q24h)  ·	strict I and O  ·	renal bladder sono rule out hydro  ·	check UA urine urea urine protein creat ratio   ·	Avoid nephrotoxins avoid hypotension   ·	ASPVD sp left BKA   ·	CAD/ CHF appreciate cardio notes 78 yo male with HFrEF s/p AICD/PPM, CAD s/p MI and CABG 2001, Gar's esophagus, Raynaud's PAD/PVD s/p L BKA presenting with SOB / CHF exacerbation     ·	ZAC on CKD 3(patient said his baseline creatinine is 1.5) rule out CRS type 2/ diabetic nephropathy  ·	Continue lasix IV current (60 mg q24h)  ·	strict I and O  ·	renal bladder sono noted no hydro   ·	Avoid nephrotoxins avoid hypotension   ·	ASPVD sp left BKA   ·	CAD/ CHF appreciate cardio notes 78 yo male with HFrEF s/p AICD/PPM, CAD s/p MI and CABG 2001, Gar's esophagus, Raynaud's PAD/PVD s/p L BKA presenting with SOB / CHF exacerbation     ·	ZAC on CKD 3(patient said his baseline creatinine is 1.5) rule out CRS type 2/ diabetic nephropathy  ·	Continue lasix IV current (60 mg q12h)  ·	strict I and O  ·	renal bladder sono noted no hydro   ·	Avoid nephrotoxins avoid hypotension   ·	ASPVD sp left BKA   ·	CAD/ CHF appreciate cardio notes   ·	left ventricle thrombus will be started on UFH    D/w HS

## 2018-05-21 NOTE — PROGRESS NOTE ADULT - ASSESSMENT
78 yo male with HFrEF s/p AICD/PPM, CAD s/p MI and CABG 2001, Gar's esophagus, Raynaud's PAD/PVD s/p L BKA presents with gradually worsening FRIAS for approximately 1 week. Admitted for CHF exacerbation, AICD battery end life and ZAC    # SOB LIKELY SEC TO DECOMPENSATED HEART FAILURE     - c/w iv diuresis , I's and O's.  - will optimize medical management as pt initially reported he was jsut taking lasix every other day at home.  -Repeat echo showed EF of < 20% .  -will add  Ivabradine , as pt unable to tolerate metoprolol in past .    # LV THROMBUS:    - 2d Echo showed large fixed left ventricle thrombus , Dr Paz was notified about finding recommended to start anticoagulation.  - will start pt on IV Heparin , xeralto cannot be considered at this time because of worsening renal function.  - pt could either be switched to coumadin or eliquis.  - will dose for coumadin tonight.    # AICD MALFUNCTION:    -Device interrogation by EPS.    # ZAC on CKD versus progression of CKD or cardiorenal syndrome      -baseline 1.4-1.5 in 2017  - creatinine is trending down.  - no hydro on USG.  - will get daily BMps.    # LEFT BELOW KNEE AMPUTATION:    -stump is clean no erythema or discharge.        # GI ppx     - on pepcid 20 mg.  #. DVT ppx -not indicated on iv heparin.

## 2018-05-21 NOTE — PROGRESS NOTE ADULT - ASSESSMENT
Systolic CHF acute on chronic exacerbation  Acute on chronic renal insufficiency  Possible AICD malfunction  2D echo noted.    C/w Lasix. Nephrology follow-up appreciated.  Patient has been unable to use metoprolol. Previously tolerated Corlanor with good response - please restart.  EP follow-up with Dr. Sosa.

## 2018-05-21 NOTE — PROGRESS NOTE ADULT - ASSESSMENT
76yo M with Past Medical History of Chronic Systolic CHF s/p AICD/PPM, CAD s/p MI and CABG 2001, Gar's esophagus, Raynaud's PAD/PVD s/p L BKA presents admitted for worsening dyspnea secondary to acute on chronic systolic CHF complicated by failing PPM battery.    Worsening Dyspnea secondary to Acute on Chronic Systolic CHF: elevated BNP on admission.  Clinically improved from admission following treatment with IV Lasix.  Hx of PPM/AICD placement: EPS recommendations were reviewed; discontinue Metoprolol and restart Corlanor.    monitor daily weights, Is and Os  cardiology:  Iv lasix 60mg q 24   check IO daily weight  Nephrology consultation  EPS consultation for device interrogation  and possible generatir change   medication reconciliation   Repeat CXR in AM   eps eval pending      ZAC on CKD versus progression of CKD or cardiorenal syndrome  baseline 1.4-1.5 in 2017  check urine lytes, Urine Creat, daily BMPs  Renal bladder US if renal function worsens  nephro eval pending      GI ppx - pepcid 20 daily  DVT ppx - heparin 78yo M with Past Medical History of Chronic Systolic CHF s/p AICD/PPM, CAD s/p MI and CABG 2001, Gar's esophagus, Raynaud's PAD/PVD s/p L BKA presents admitted for worsening dyspnea secondary to acute on chronic systolic CHF complicated by failing PPM battery.  Worsening Dyspnea secondary to Acute on Chronic Systolic CHF: elevated BNP on admission.  Clinically improved from admission following treatment with IV Lasix.  Hx of PPM/AICD placement: EPS recommendations were reviewed; discontinue Metoprolol and restart Corlanor.  Left Ventricular Thrombus: large thrombus identified on repeat echocardiogram.  Start therapeutic heparin gtt with target PTT 60-80.  Start Coumadin this evening (as per cardiology).  Acute on Chronic Renal Failure: baseline CKD III.  Secondary to progression of renal failure vs. cardiorenal syndrome.  Serum creatinine has improved following diuretic therapy.  Repeat BMP tomorrow.  GI/DVT prophylaxis  case discussed with housestaff at length

## 2018-05-21 NOTE — PROGRESS NOTE ADULT - SUBJECTIVE AND OBJECTIVE BOX
Nephrology progress note    Patient is seen and examined, events over the last 24 h noted .    Allergies:  Allergy Status Unknown    Hospital Medications:   MEDICATIONS  (STANDING):  aspirin  chewable 81 milliGRAM(s) Oral daily  famotidine    Tablet 20 milliGRAM(s) Oral daily  furosemide   Injectable 60 milliGRAM(s) IV Push two times a day  heparin  Injectable 5000 Unit(s) SubCutaneous every 8 hours  metoprolol tartrate 25 milliGRAM(s) Oral two times a day        VITALS:  T(F): 98 (05-21-18 @ 07:47), Max: 98 (05-21-18 @ 07:47)  HR: 64 (05-21-18 @ 07:47)  BP: 92/59 (05-21-18 @ 07:47)  RR: 18 (05-21-18 @ 07:47)  SpO2: 96% (05-21-18 @ 07:47)  Wt(kg): --    05-19 @ 07:01  -  05-20 @ 07:00  --------------------------------------------------------  IN: 0 mL / OUT: 600 mL / NET: -600 mL          PHYSICAL EXAM:  Constitutional: NAD  HEENT: anicteric sclera, oropharynx clear, MMM  Neck: No JVD  Respiratory: CTAB, no wheezes, rales or rhonchi  Cardiovascular: S1, S2, RRR  Gastrointestinal: BS+, soft, NT/ND  Extremities: No cyanosis or clubbing. No peripheral edema  :  No delcid.   Skin: No rashes    LABS:  05-21    140  |  98  |  68<HH>  ----------------------------<  179<H>  4.2   |  29  |  2.2<H>  Creatinine Trend: 2.2<--, 2.6<--, 2.1<--  Ca    8.0<L>      21 May 2018 09:20    TPro  5.0<L>  /  Alb  2.7<L>  /  TBili  0.5  /  DBili      /  AST  42<H>  /  ALT  14  /  AlkPhos  96  05-19                          14.1   8.91  )-----------( 290      ( 21 May 2018 09:20 )             45.7       Urine Studies:      RADIOLOGY & ADDITIONAL STUDIES: Nephrology progress note    Patient is seen and examined, events over the last 24 h noted .    Allergies:  Allergy Status Unknown    Hospital Medications:   MEDICATIONS  (STANDING):  aspirin  chewable 81 milliGRAM(s) Oral daily  famotidine    Tablet 20 milliGRAM(s) Oral daily  furosemide   Injectable 60 milliGRAM(s) IV Push two times a day  heparin  Injectable 5000 Unit(s) SubCutaneous every 8 hours  metoprolol tartrate 25 milliGRAM(s) Oral two times a day        VITALS:  T(F): 98 (05-21-18 @ 07:47), Max: 98 (05-21-18 @ 07:47)  HR: 64 (05-21-18 @ 07:47)  BP: 92/59 (05-21-18 @ 07:47)  RR: 18 (05-21-18 @ 07:47)  SpO2: 96% (05-21-18 @ 07:47)  Wt(kg): --    05-19 @ 07:01  -  05-20 @ 07:00  --------------------------------------------------------  IN: 0 mL / OUT: 600 mL / NET: -600 mL          PHYSICAL EXAM:  Constitutional: NAD  HEENT: anicteric sclera, oropharynx clear, MMM  Neck: No JVD  Respiratory: CTAB, no wheezes, rales or rhonchi  Cardiovascular: S1, S2, RRR  Gastrointestinal: BS+, soft, NT/ND  Extremities: No cyanosis or clubbing. No peripheral edema  :  No delcid.   Skin: No rashes    LABS:  05-21    140  |  98  |  68<HH>  ----------------------------<  179<H>  4.2   |  29  |  2.2<H>  Creatinine Trend: 2.2<--, 2.6<--, 2.1<--  Ca    8.0<L>      21 May 2018 09:20    TPro  5.0<L>  /  Alb  2.7<L>  /  TBili  0.5  /  DBili      /  AST  42<H>  /  ALT  14  /  AlkPhos  96  05-19                          14.1   8.91  )-----------( 290      ( 21 May 2018 09:20 )             45.7       Urine Studies:      RADIOLOGY & ADDITIONAL STUDIES:  < from: US Retroperitoneal Complete (05.20.18 @ 20:48) >  RIGHT KIDNEY: Normal in echogenicity, size measuring 11 cm in length. No   evidence of hydronephrosis, calculus or solid mass. There is a 2.8 cm   cyst.    LEFT KIDNEY: Normal in echogenicity, size measuring 9.9 cm in length. No   evidence of hydronephrosis, calculus or solid mass. There is a 2 cm cyst.    URINARY BLADDER: Empty therefore not well evaluated.      < end of copied text >  < from: Transthoracic Echocardiogram (05.20.18 @ 10:13) >  1. Left ventricular ejection fraction, by visual estimation, is <20%.   2. Severely decreased global left ventricular systolic function.   3. Large, fixed thrombus on the apical wall of the left ventricle.   4. Mildly increased left ventricular internal cavity size.   5. Moderately reduced RV systolic function.   6. Mild mitral valve regurgitation.   7. Moderate-severe tricuspid regurgitation.   8. Mild aortic regurgitation.   9. Estimated pulmonary artery systolicpressure is 42.6 mmHg assuming a   right atrial pressure of 15 mmHg, which is consistent with mild pulmonary   hypertension.    < end of copied text > Nephrology progress note    Patient is seen and examined, events over the last 24 h noted .  Had problems with PPM not capturing     Allergies:  Allergy Status Unknown    Hospital Medications:   MEDICATIONS  (STANDING):  aspirin  chewable 81 milliGRAM(s) Oral daily  famotidine    Tablet 20 milliGRAM(s) Oral daily  furosemide   Injectable 60 milliGRAM(s) IV Push two times a day  heparin  Injectable 5000 Unit(s) SubCutaneous every 8 hours  metoprolol tartrate 25 milliGRAM(s) Oral two times a day        VITALS:  T(F): 98 (05-21-18 @ 07:47), Max: 98 (05-21-18 @ 07:47)  HR: 64 (05-21-18 @ 07:47)  BP: 92/59 (05-21-18 @ 07:47)  RR: 18 (05-21-18 @ 07:47)  SpO2: 96% (05-21-18 @ 07:47)      05-19 @ 07:01  -  05-20 @ 07:00  --------------------------------------------------------  IN: 0 mL / OUT: 600 mL / NET: -600 mL          PHYSICAL EXAM:  Constitutional: NAD  HEENT: anicteric sclera, oropharynx clear, MMM  Neck: No JVD  Respiratory: CTAB, no wheezes, rales or rhonchi  Cardiovascular: S1, S2, RRR  Gastrointestinal: BS+, soft, NT/ND  Extremities: No cyanosis or clubbing. No peripheral edema, left BKA   Skin: No rashes    LABS:  05-21    140  |  98  |  68<HH>  ----------------------------<  179<H>  4.2   |  29  |  2.2<H>  Creatinine Trend: 2.2<--, 2.6<--, 2.1<--  Ca    8.0<L>      21 May 2018 09:20    TPro  5.0<L>  /  Alb  2.7<L>  /  TBili  0.5  /  DBili      /  AST  42<H>  /  ALT  14  /  AlkPhos  96  05-19                          14.1   8.91  )-----------( 290      ( 21 May 2018 09:20 )             45.7       Urine Studies:      RADIOLOGY & ADDITIONAL STUDIES:  < from: US Retroperitoneal Complete (05.20.18 @ 20:48) >  RIGHT KIDNEY: Normal in echogenicity, size measuring 11 cm in length. No   evidence of hydronephrosis, calculus or solid mass. There is a 2.8 cm   cyst.    LEFT KIDNEY: Normal in echogenicity, size measuring 9.9 cm in length. No   evidence of hydronephrosis, calculus or solid mass. There is a 2 cm cyst.    URINARY BLADDER: Empty therefore not well evaluated.      < end of copied text >  < from: Transthoracic Echocardiogram (05.20.18 @ 10:13) >  1. Left ventricular ejection fraction, by visual estimation, is <20%.   2. Severely decreased global left ventricular systolic function.   3. Large, fixed thrombus on the apical wall of the left ventricle.   4. Mildly increased left ventricular internal cavity size.   5. Moderately reduced RV systolic function.   6. Mild mitral valve regurgitation.   7. Moderate-severe tricuspid regurgitation.   8. Mild aortic regurgitation.   9. Estimated pulmonary artery systolicpressure is 42.6 mmHg assuming a   right atrial pressure of 15 mmHg, which is consistent with mild pulmonary   hypertension.    < end of copied text >

## 2018-05-21 NOTE — PROGRESS NOTE ADULT - SUBJECTIVE AND OBJECTIVE BOX
JOSE DGASTON WRIGHT [id number]    Hospitalist Note  78yo M with Past Medical History of Chronic Systolic CHF s/p AICD/PPM, CAD s/p MI and CABG 2001, Gar's esophagus, Raynaud's PAD/PVD s/p L BKA presents admitted for worsening dyspnea secondary to acute on chronic systolic CHF complicated by failing PPM battery.    Overnight events/Updates: The pt feels betters from admission though still complains of dyspnea with minimal exertion.     Vital Signs Last 24 Hrs  T(C): 36.7 (21 May 2018 07:47), Max: 36.7 (21 May 2018 07:47)  T(F): 98 (21 May 2018 07:47), Max: 98 (21 May 2018 07:47)  HR: 64 (21 May 2018 07:47) (64 - 80)  BP: 92/59 (21 May 2018 07:47) (92/59 - 129/80)  BP(mean): --  RR: 18 (21 May 2018 07:47) (18 - 20)  SpO2: 96% (21 May 2018 07:47) (96% - 100%)    Physical Examination:  General: AAO x 3  HEENT: PERRLA, EOMI  CV= S1 & S2 appreciated  Lungs=  Abdominal Examination= + BS, Soft, NT/ND  Extremity Examination= left BKA, swelling to the RLE    ROS: No chest pain, no shortness of breath.  All other systems reviewed and are within normal limits except for the complaints in the HPI.    MEDICATIONS  (STANDING):  aspirin  chewable 81 milliGRAM(s) Oral daily  famotidine    Tablet 20 milliGRAM(s) Oral daily  furosemide   Injectable 60 milliGRAM(s) IV Push two times a day  heparin  Infusion 1500 Unit(s)/Hr (15 mL/Hr) IV Continuous <Continuous>  heparin  Injectable 5000 Unit(s) SubCutaneous every 8 hours  metoprolol tartrate 25 milliGRAM(s) Oral two times a day  warfarin 5 milliGRAM(s) Oral at bedtime    MEDICATIONS  (PRN):                            14.1   8.91  )-----------( 290      ( 21 May 2018 09:20 )             45.7     05-21    140  |  98  |  68<HH>  ----------------------------<  179<H>  4.2   |  29  |  2.2<H>    Ca    8.0<L>      21 May 2018 09:20    TPro  5.0<L>  /  Alb  2.7<L>  /  TBili  0.5  /  DBili  x   /  AST  42<H>  /  ALT  14  /  AlkPhos  96  05-19      Case discussed with housestaff & family  SARA Lilly [id number] JOSE DKYLE GASTON [id number]    Hospitalist Note  78yo M with Past Medical History of Chronic Systolic CHF s/p AICD/PPM, CAD s/p MI and CABG 2001, Gar's esophagus, Raynaud's PAD/PVD s/p L BKA presents admitted for worsening dyspnea secondary to acute on chronic systolic CHF complicated by failing PPM battery.    Overnight events/Updates: The pt feels betters from admission though still complains of dyspnea with minimal exertion.     Vital Signs Last 24 Hrs  T(C): 36.7 (21 May 2018 07:47), Max: 36.7 (21 May 2018 07:47)  T(F): 98 (21 May 2018 07:47), Max: 98 (21 May 2018 07:47)  HR: 64 (21 May 2018 07:47) (64 - 80)  BP: 92/59 (21 May 2018 07:47) (92/59 - 129/80)  BP(mean): --  RR: 18 (21 May 2018 07:47) (18 - 20)  SpO2: 96% (21 May 2018 07:47) (96% - 100%)    Physical Examination:  General: AAO x 3  HEENT: PERRLA, EOMI  CV= S1 & S2 appreciated  Lungs= CTA BL  Abdominal Examination= + BS, Soft, NT/ND  Extremity Examination= left BKA, swelling to the RLE    ROS: No chest pain.  Dyspnea with minimal exertion  All other systems reviewed and are within normal limits except for the complaints in the HPI.    MEDICATIONS  (STANDING):  aspirin  chewable 81 milliGRAM(s) Oral daily  famotidine    Tablet 20 milliGRAM(s) Oral daily  furosemide   Injectable 60 milliGRAM(s) IV Push two times a day  heparin  Infusion 1500 Unit(s)/Hr (15 mL/Hr) IV Continuous <Continuous>  heparin  Injectable 5000 Unit(s) SubCutaneous every 8 hours  metoprolol tartrate 25 milliGRAM(s) Oral two times a day  warfarin 5 milliGRAM(s) Oral at bedtime    MEDICATIONS  (PRN):                            14.1   8.91  )-----------( 290      ( 21 May 2018 09:20 )             45.7     05-21    140  |  98  |  68<HH>  ----------------------------<  179<H>  4.2   |  29  |  2.2<H>    Ca    8.0<L>      21 May 2018 09:20    TPro  5.0<L>  /  Alb  2.7<L>  /  TBili  0.5  /  DBili  x   /  AST  42<H>  /  ALT  14  /  AlkPhos  96  05-19      Case discussed with housestaff & family  SARA Lilly [phone]

## 2018-05-21 NOTE — PROGRESS NOTE ADULT - SUBJECTIVE AND OBJECTIVE BOX
Patient is a 77y old  Male who presents with a chief complaint of FRIAS x1 week (19 May 2018 18:21)    HPI:  78 yo male with HFrEF s/p AICD/PPM, CAD s/p MI and CABG 2001, Gar's esophagus, Raynaud's PAD/PVD s/p L BKA presents with gradually worsening FRIAS for approximately 1 week. Patient states that he feels fine at rest, however becomes SOB when moving or changing positions. He cannot walk a block and it takes him a very long time to walk up a few steps in his house. He denies any CP, palpitations, cough, dizziness or lightheadedness. Of note, patient saw his EP Dr Sosa a few months ago, and was told that his AICD battery is nearing end of life and that one of the leads is not working properly. In the ED, he was found to be in fluid overload with a BNP 47999, mildly elevated troponins and ZAC. He did not receive any medications    Patient states that he is prescribed Lasix 80, which he takes every other day, last taken 2 days ago (19 May 2018 18:21)      SUBJ:  Patient seen and examined.      MEDICATIONS  (STANDING):  aspirin  chewable 81 milliGRAM(s) Oral daily  famotidine    Tablet 20 milliGRAM(s) Oral daily  furosemide   Injectable 60 milliGRAM(s) IV Push two times a day  heparin  Injectable 5000 Unit(s) SubCutaneous every 8 hours  metoprolol tartrate 25 milliGRAM(s) Oral two times a day    MEDICATIONS  (PRN):            Vital Signs Last 24 Hrs  T(C): 36.7 (21 May 2018 07:47), Max: 36.7 (21 May 2018 07:47)  T(F): 98 (21 May 2018 07:47), Max: 98 (21 May 2018 07:47)  HR: 64 (21 May 2018 07:47) (64 - 80)  BP: 92/59 (21 May 2018 07:47) (92/59 - 129/80)  BP(mean): --  RR: 18 (21 May 2018 07:47) (18 - 20)  SpO2: 96% (21 May 2018 07:47) (96% - 100%)      PHYSICAL EXAM:    GEN: AAO x 3, NAD  HEENT: NC/AT, PERRL  Neck: No JVD, no bruits  CV: Reg, S1-S2, soft syst. murmur  Lungs: CTAB  Abd: Soft, non-tender  Ext: BKA      I&O's Summary  	        ECG:  < from: 12 Lead ECG (05.21.18 @ 01:31) >  Ventricular-paced rhythm with occasional AV dual-paced complexes  Biventricular pacemaker detected  Abnormal ECG    < end of copied text >    TTE:  < from: Transthoracic Echocardiogram (05.20.18 @ 10:13) >    Summary:   1. Left ventricular ejection fraction, by visual estimation, is <20%.   2. Severely decreased global left ventricular systolic function.   3. Large, fixed thrombus on the apical wall of the left ventricle.   4. Mildly increased left ventricular internal cavity size.   5. Moderately reduced RV systolic function.   6. Mild mitral valve regurgitation.   7. Moderate-severe tricuspid regurgitation.   8. Mild aortic regurgitation.   9. Estimated pulmonary artery systolicpressure is 42.6 mmHg assuming a   right atrial pressure of 15 mmHg, which is consistent with mild pulmonary   hypertension.    < end of copied text >      LABS:                        13.5   7.95  )-----------( 279      ( 19 May 2018 15:45 )             43.5     05-19    138  |  98  |  62<HH>  ----------------------------<  154<H>  4.1   |  28  |  2.6<H>    Ca    7.8<L>      19 May 2018 21:04    TPro  5.0<L>  /  Alb  2.7<L>  /  TBili  0.5  /  DBili  x   /  AST  42<H>  /  ALT  14  /  AlkPhos  96  05-19    CARDIAC MARKERS ( 20 May 2018 06:51 )  x     / 0.11 ng/mL / 196 U/L / x     / 7.7 ng/mL  CARDIAC MARKERS ( 19 May 2018 21:04 )  x     / 0.10 ng/mL / 174 U/L / x     / 7.3 ng/mL  CARDIAC MARKERS ( 19 May 2018 15:45 )  x     / 0.09 ng/mL / 192 U/L / x     / 7.7 ng/mL      PT/INR - ( 19 May 2018 15:45 )   PT: 12.80 sec;   INR: 1.18 ratio         PTT - ( 19 May 2018 15:45 )  PTT:29.8 sec      BNP  RADIOLOGY & ADDITIONAL STUDIES:      IMPRESSION AND PLAN:

## 2018-05-22 LAB
ANION GAP SERPL CALC-SCNC: 12 MMOL/L — SIGNIFICANT CHANGE UP (ref 7–14)
ANION GAP SERPL CALC-SCNC: 14 MMOL/L — SIGNIFICANT CHANGE UP (ref 7–14)
APTT BLD: 69.1 SEC — HIGH (ref 27–39.2)
APTT BLD: 73.3 SEC — CRITICAL HIGH (ref 27–39.2)
BUN SERPL-MCNC: 75 MG/DL — CRITICAL HIGH (ref 10–20)
BUN SERPL-MCNC: 76 MG/DL — CRITICAL HIGH (ref 10–20)
CALCIUM SERPL-MCNC: 7.6 MG/DL — LOW (ref 8.5–10.1)
CALCIUM SERPL-MCNC: 7.9 MG/DL — LOW (ref 8.5–10.1)
CHLORIDE SERPL-SCNC: 96 MMOL/L — LOW (ref 98–110)
CHLORIDE SERPL-SCNC: 99 MMOL/L — SIGNIFICANT CHANGE UP (ref 98–110)
CO2 SERPL-SCNC: 28 MMOL/L — SIGNIFICANT CHANGE UP (ref 17–32)
CO2 SERPL-SCNC: 29 MMOL/L — SIGNIFICANT CHANGE UP (ref 17–32)
CREAT SERPL-MCNC: 2.2 MG/DL — HIGH (ref 0.7–1.5)
CREAT SERPL-MCNC: 2.2 MG/DL — HIGH (ref 0.7–1.5)
GLUCOSE SERPL-MCNC: 134 MG/DL — HIGH (ref 70–99)
GLUCOSE SERPL-MCNC: 99 MG/DL — SIGNIFICANT CHANGE UP (ref 70–99)
INR BLD: 1.2 RATIO — SIGNIFICANT CHANGE UP (ref 0.65–1.3)
POTASSIUM SERPL-MCNC: 3.8 MMOL/L — SIGNIFICANT CHANGE UP (ref 3.5–5)
POTASSIUM SERPL-MCNC: 3.9 MMOL/L — SIGNIFICANT CHANGE UP (ref 3.5–5)
POTASSIUM SERPL-SCNC: 3.8 MMOL/L — SIGNIFICANT CHANGE UP (ref 3.5–5)
POTASSIUM SERPL-SCNC: 3.9 MMOL/L — SIGNIFICANT CHANGE UP (ref 3.5–5)
PROTHROM AB SERPL-ACNC: 13 SEC — HIGH (ref 9.95–12.87)
SODIUM SERPL-SCNC: 138 MMOL/L — SIGNIFICANT CHANGE UP (ref 135–146)
SODIUM SERPL-SCNC: 140 MMOL/L — SIGNIFICANT CHANGE UP (ref 135–146)

## 2018-05-22 RX ORDER — SIMETHICONE 80 MG/1
80 TABLET, CHEWABLE ORAL THREE TIMES A DAY
Qty: 0 | Refills: 0 | Status: DISCONTINUED | OUTPATIENT
Start: 2018-05-22 | End: 2018-05-25

## 2018-05-22 RX ORDER — HEPARIN SODIUM 5000 [USP'U]/ML
1300 INJECTION INTRAVENOUS; SUBCUTANEOUS
Qty: 25000 | Refills: 0 | Status: DISCONTINUED | OUTPATIENT
Start: 2018-05-22 | End: 2018-05-22

## 2018-05-22 RX ORDER — WARFARIN SODIUM 2.5 MG/1
5 TABLET ORAL ONCE
Qty: 0 | Refills: 0 | Status: COMPLETED | OUTPATIENT
Start: 2018-05-22 | End: 2018-05-22

## 2018-05-22 RX ORDER — HEPARIN SODIUM 5000 [USP'U]/ML
1200 INJECTION INTRAVENOUS; SUBCUTANEOUS
Qty: 25000 | Refills: 0 | Status: DISCONTINUED | OUTPATIENT
Start: 2018-05-22 | End: 2018-05-23

## 2018-05-22 RX ADMIN — FAMOTIDINE 20 MILLIGRAM(S): 10 INJECTION INTRAVENOUS at 11:37

## 2018-05-22 RX ADMIN — WARFARIN SODIUM 5 MILLIGRAM(S): 2.5 TABLET ORAL at 22:10

## 2018-05-22 RX ADMIN — SIMETHICONE 80 MILLIGRAM(S): 80 TABLET, CHEWABLE ORAL at 22:11

## 2018-05-22 RX ADMIN — HEPARIN SODIUM 12 UNIT(S)/HR: 5000 INJECTION INTRAVENOUS; SUBCUTANEOUS at 13:20

## 2018-05-22 RX ADMIN — Medication 81 MILLIGRAM(S): at 11:37

## 2018-05-22 RX ADMIN — Medication 25 MILLIGRAM(S): at 18:15

## 2018-05-22 RX ADMIN — SIMETHICONE 80 MILLIGRAM(S): 80 TABLET, CHEWABLE ORAL at 14:26

## 2018-05-22 RX ADMIN — Medication 60 MILLIGRAM(S): at 18:14

## 2018-05-22 RX ADMIN — WARFARIN SODIUM 5 MILLIGRAM(S): 2.5 TABLET ORAL at 01:40

## 2018-05-22 RX ADMIN — HEPARIN SODIUM 13 UNIT(S)/HR: 5000 INJECTION INTRAVENOUS; SUBCUTANEOUS at 00:05

## 2018-05-22 NOTE — PROGRESS NOTE ADULT - SUBJECTIVE AND OBJECTIVE BOX
Nephrology progress note    Patient is seen and examined, events over the last 24 h noted .    Allergies:  Allergy Status Unknown    Hospital Medications:   MEDICATIONS  (STANDING):  aspirin  chewable 81 milliGRAM(s) Oral daily  famotidine    Tablet 20 milliGRAM(s) Oral daily  furosemide   Injectable 60 milliGRAM(s) IV Push two times a day  heparin  Infusion 1200 Unit(s)/Hr (12 mL/Hr) IV Continuous <Continuous>  metoprolol tartrate 25 milliGRAM(s) Oral two times a day  simethicone 80 milliGRAM(s) Chew three times a day  warfarin 5 milliGRAM(s) Oral once        VITALS:  T(F): 97.6 (05-22-18 @ 08:34), Max: 98 (05-21-18 @ 16:38)  HR: 108 (05-22-18 @ 08:34)  BP: 118/65 (05-22-18 @ 08:34)  RR: 20 (05-22-18 @ 08:34)  SpO2: 97% (05-22-18 @ 08:34)  Wt(kg): --    05-21 @ 07:01  -  05-22 @ 07:00  --------------------------------------------------------  IN: 0 mL / OUT: 500 mL / NET: -500 mL    05-22 @ 07:01  -  05-22 @ 12:46  --------------------------------------------------------  IN: 26 mL / OUT: 0 mL / NET: 26 mL        Weight (kg): 79.4 (05-22 @ 09:38)    PHYSICAL EXAM:  Constitutional: NAD  HEENT: anicteric sclera, oropharynx clear, MMM  Neck: No JVD  Respiratory: CTAB, no wheezes, rales or rhonchi  Cardiovascular: S1, S2, RRR  Gastrointestinal: BS+, soft, NT/ND  Extremities: No cyanosis or clubbing. No peripheral edema  :  No delcid.   Skin: No rashes    LABS:  05-21    140  |  98  |  68<HH>  ----------------------------<  179<H>  4.2   |  29  |  2.2<H>  Creatinine Trend: 2.2<--, 2.6<--, 2.1<--  Ca    8.0<L>      21 May 2018 09:20                            14.1   8.91  )-----------( 290      ( 21 May 2018 09:20 )             45.7       Urine Studies:      RADIOLOGY & ADDITIONAL STUDIES: Nephrology progress note    Patient is seen and examined, events over the last 24 h noted .  complained of swelling lower ext     Allergies:  Allergy Status Unknown    Hospital Medications:   MEDICATIONS  (STANDING):  aspirin  chewable 81 milliGRAM(s) Oral daily  famotidine    Tablet 20 milliGRAM(s) Oral daily  furosemide   Injectable 60 milliGRAM(s) IV Push two times a day  heparin  Infusion 1200 Unit(s)/Hr (12 mL/Hr) IV Continuous <Continuous>  metoprolol tartrate 25 milliGRAM(s) Oral two times a day  simethicone 80 milliGRAM(s) Chew three times a day  warfarin 5 milliGRAM(s) Oral once        VITALS:  T(F): 97.6 (05-22-18 @ 08:34), Max: 98 (05-21-18 @ 16:38)  HR: 108 (05-22-18 @ 08:34)  BP: 118/65 (05-22-18 @ 08:34)  RR: 20 (05-22-18 @ 08:34)  SpO2: 97% (05-22-18 @ 08:34)      05-21 @ 07:01  -  05-22 @ 07:00  --------------------------------------------------------  IN: 0 mL / OUT: 500 mL / NET: -500 mL    05-22 @ 07:01  -  05-22 @ 12:46  --------------------------------------------------------  IN: 26 mL / OUT: 0 mL / NET: 26 mL        Weight (kg): 79.4 (05-22 @ 09:38)    PHYSICAL EXAM:  Constitutional: NAD  HEENT: anicteric sclera, oropharynx clear, MMM  Neck: No JVD  Respiratory: CTAB, no wheezes, rales or rhonchi  Cardiovascular: S1, S2, RRR  Gastrointestinal: BS+, soft, NT/ND  Extremities: No cyanosis or clubbing. left BKA edema plus one lower ext   :  No delcid.   Skin: No rashes    LABS:  05-21    140  |  98  |  68<HH>  ----------------------------<  179<H>  4.2   |  29  |  2.2<H>  Creatinine Trend: 2.2<--, 2.6<--, 2.1<--  Ca    8.0<L>      21 May 2018 09:20                            14.1   8.91  )-----------( 290      ( 21 May 2018 09:20 )             45.7       Urine Studies:      RADIOLOGY & ADDITIONAL STUDIES:

## 2018-05-22 NOTE — PROGRESS NOTE ADULT - SUBJECTIVE AND OBJECTIVE BOX
LENGTH OF HOSPITAL STAY: 3d    CHIEF COMPLAINT:   Patient is a 77y old  Male who presents with a chief complaint of FRIAS x1 week (19 May 2018 18:21)    #OVERNIGHT EVENTS:    -no acute events overniht. pt received coumadin last night.      HISTORY OF PRESENTING ILLNESS:    HPI:  78 yo male with HFrEF s/p AICD/PPM, CAD s/p MI and CABG 2001, Gar's esophagus, Raynaud's PAD/PVD s/p L BKA presents with gradually worsening FRIAS for approximately 1 week. Patient states that he feels fine at rest, however becomes SOB when moving or changing positions. He cannot walk a block and it takes him a very long time to walk up a few steps in his house. He denies any CP, palpitations, cough, dizziness or lightheadedness. Of note, patient saw his EP Dr Sosa a few months ago, and was told that his AICD battery is nearing end of life and that one of the leads is not working properly. In the ED, he was found to be in fluid overload with a BNP 60768, mildly elevated troponins and ZAC. He did not receive any medications    Patient states that he is prescribed Lasix 80, which he takes every other day, last taken 2 days ago (19 May 2018 18:21)    PAST MEDICAL & SURGICAL HISTORY  PAST MEDICAL & SURGICAL HISTORY:  Raynauds phenomenon  Endarteritis  CAD (coronary artery disease)  Stented coronary artery  MI (myocardial infarction)  Pacemaker  DM (diabetes mellitus)  Gar esophagus  CHF (congestive heart failure)  S/P CABG (coronary artery bypass graft)  S/P BKA (below knee amputation), left    SOCIAL HISTORY:    ALLERGIES:  Allergy Status Unknown    MEDICATIONS:  STANDING MEDICATIONS  aspirin  chewable 81 milliGRAM(s) Oral daily  famotidine    Tablet 20 milliGRAM(s) Oral daily  furosemide   Injectable 60 milliGRAM(s) IV Push two times a day  heparin  Infusion 1300 Unit(s)/Hr IV Continuous <Continuous>  metoprolol tartrate 25 milliGRAM(s) Oral two times a day  simethicone 80 milliGRAM(s) Chew three times a day  warfarin 5 milliGRAM(s) Oral once    PRN MEDICATIONS    VITALS:   T(F): 97.6  HR: 108  BP: 118/65  RR: 20  SpO2: 97%    LABS:                        14.1   8.91  )-----------( 290      ( 21 May 2018 09:20 )             45.7     05-21    140  |  98  |  68<HH>  ----------------------------<  179<H>  4.2   |  29  |  2.2<H>    Ca    8.0<L>      21 May 2018 09:20      PT/INR - ( 22 May 2018 09:02 )   PT: 13.00 sec;   INR: 1.20 ratio         PTT - ( 22 May 2018 09:02 )  PTT:69.1 sec          CARDIAC MARKERS ( 21 May 2018 09:20 )  x     / 0.12 ng/mL / 228 U/L / x     / 9.8 ng/mL      RADIOLOGY:    PHYSICAL EXAM:  GEN: No acute distress  HEENT: nc/at perrla  LUNGS: Clear to auscultation bilaterally   HEART: S1/S2 present. RRR.   ABD: Soft, non-tender, non-distended. Bowel sounds present  EXT: left bka stump site no erythema or discharg  NEURO: AAOX3

## 2018-05-22 NOTE — PROGRESS NOTE ADULT - ASSESSMENT
78 yo male with HFrEF s/p AICD/PPM, CAD s/p MI and CABG 2001, Gar's esophagus, Raynaud's PAD/PVD s/p L BKA presents with gradually worsening FRIAS for approximately 1 week. Admitted for CHF exacerbation, AICD battery end life and ZAC    # SOB LIKELY SEC TO DECOMPENSATED HEART FAILURE     - c/w iv diuresis will switch him to 60 m g q 24 today , I's and O's.  - will optimize medical management as pt initially reported he was taking lasix every other day at home.  -Repeat echo showed EF of < 20% .  -will add  Ivabradine , as pt unable to tolerate metoprolol in past . not available in hospital.     # LV THROMBUS:    -  pt on IV Heparin , xeralto cannot be considered at this time because of worsening renal function new ZAC on CKD.  - will c/w coumadin , will bridge with heparin until INR is therapeutic.  - will dose for coumadin tonight.    # AICD MALFUNCTION:    -Device interrogation by EPS done yesterday.  - pt needs an upgrade to BIV and generator change , was told in past is high rsik for general anesthesia in past.    # ZAC on CKD versus progression of CKD or cardiorenal syndrome      -baseline 1.4-1.5 in 2017  - creatinine is trending down. no labs available for today , will get labs at 4 pm.  - no hydro on USG.  - will get daily BMps.    # LEFT BELOW KNEE AMPUTATION:    -stump is clean no erythema or discharge.        # GI ppx     - on pepcid 20 mg.  #. DVT ppx -not indicated on iv heparin.

## 2018-05-22 NOTE — PROGRESS NOTE ADULT - SUBJECTIVE AND OBJECTIVE BOX
SUBJ:  Patient seen and examined. Still with edema and dyspnea. Echo revealed LV thrombus.      MEDICATIONS  (STANDING):  aspirin  chewable 81 milliGRAM(s) Oral daily  famotidine    Tablet 20 milliGRAM(s) Oral daily  furosemide   Injectable 60 milliGRAM(s) IV Push two times a day  heparin  Infusion 1300 Unit(s)/Hr (13 mL/Hr) IV Continuous <Continuous>  metoprolol tartrate 25 milliGRAM(s) Oral two times a day    MEDICATIONS  (PRN):          Vital Signs Last 24 Hrs  T(C): 36.4 (22 May 2018 08:34), Max: 36.7 (21 May 2018 16:38)  T(F): 97.6 (22 May 2018 08:34), Max: 98 (21 May 2018 16:38)  HR: 108 (22 May 2018 08:34) (68 - 108)  BP: 118/65 (22 May 2018 08:34) (115/62 - 118/66)  BP(mean): --  RR: 20 (22 May 2018 08:34) (18 - 20)  SpO2: 97% (22 May 2018 08:34) (97% - 99%)      PHYSICAL EXAM:    GEN: AAO x 3, NAD  HEENT: NC/AT  Neck: No JVD  CV: Reg, S1-S2  Lungs: CTAB  Abd: Soft, non-tender  Ext: L BKA      I&O's Summary    21 May 2018 07:01  -  22 May 2018 07:00  --------------------------------------------------------  IN: 0 mL / OUT: 500 mL / NET: -500 mL    	    TTE:    < from: Transthoracic Echocardiogram (05.20.18 @ 10:13) >  Summary:   1. Left ventricular ejection fraction, by visual estimation, is <20%.   2. Severely decreased global left ventricular systolic function.   3. Large, fixed thrombus on the apical wall of the left ventricle.   4. Mildly increased left ventricular internal cavity size.   5. Moderately reduced RV systolic function.   6. Mild mitral valve regurgitation.   7. Moderate-severe tricuspid regurgitation.   8. Mild aortic regurgitation.   9. Estimated pulmonary artery systolicpressure is 42.6 mmHg assuming a   right atrial pressure of 15 mmHg, which is consistent with mild pulmonary   hypertension.    < end of copied text >    LABS:                        14.1   8.91  )-----------( 290      ( 21 May 2018 09:20 )             45.7     05-21    140  |  98  |  68<HH>  ----------------------------<  179<H>  4.2   |  29  |  2.2<H>    Ca    8.0<L>      21 May 2018 09:20      CARDIAC MARKERS ( 21 May 2018 09:20 )  x     / 0.12 ng/mL / 228 U/L / x     / 9.8 ng/mL      PT/INR - ( 21 May 2018 22:51 )   PT: 14.00 sec;   INR: 1.29 ratio         PTT - ( 21 May 2018 20:39 )  PTT:>200.0 sec      BNP  RADIOLOGY & ADDITIONAL STUDIES:      IMPRESSION AND PLAN:

## 2018-05-22 NOTE — CONSULT NOTE ADULT - SUBJECTIVE AND OBJECTIVE BOX
78 y/o male with PMH below admitted for CHF. Pt seen at bedside NAD. Pt with right foot ulcer. pt said that his right foot is painful and he applies lidocaine patch 5%.     HPI:  78 yo male with HFrEF s/p AICD/PPM, CAD s/p MI and CABG 2001, Gar's esophagus, Raynaud's PAD/PVD s/p L BKA presents with gradually worsening FRIAS for approximately 1 week. Patient states that he feels fine at rest, however becomes SOB when moving or changing positions. He cannot walk a block and it takes him a very long time to walk up a few steps in his house. He denies any CP, palpitations, cough, dizziness or lightheadedness. Of note, patient saw his EP Dr Sosa a few months ago, and was told that his AICD battery is nearing end of life and that one of the leads is not working properly. In the ED, he was found to be in fluid overload with a BNP 44348, mildly elevated troponins and ZAC. He did not receive any medications    Patient states that he is prescribed Lasix 80, which he takes every other day, last taken 2 days ago (19 May 2018 18:21)    PMH: PLEURAL EFFUSION CHF CONGESTIVE HEART FAILURE AICD BATTERY FAILURE  Raynauds phenomenon  Endarteritis  CAD (coronary artery disease)  Stented coronary artery  MI (myocardial infarction)  Pacemaker  DM (diabetes mellitus)  Gar esophagus  CHF (congestive heart failure)    PSH: S/P CABG (coronary artery bypass graft)  S/P BKA (below knee amputation), left    Medication   Allergy: Allergy Status Unknown        Labs:                        14.1   8.91  )-----------( 290      ( 21 May 2018 09:20 )             45.7     PT/INR - ( 22 May 2018 09:02 )   PT: 13.00 sec;   INR: 1.20 ratio         PTT - ( 22 May 2018 09:02 )  PTT:69.1 sec  05-22    140  |  99  |  75<HH>  ----------------------------<  99  3.8   |  29  |  2.2<H>    Ca    7.6<L>      22 May 2018 12:27      CARDIAC MARKERS ( 21 May 2018 09:20 )  x     / 0.12 ng/mL / 228 U/L / x     / 9.8 ng/mL          O:   Derm: right medial 1st mpj superficial ulcer that fibrotic. no active sign of infection noted. right foot cyanotic and cool to touch. dorsal 1st toe nail ulcer that's fibrotic with no sign of infection. Pain upon palpation right foot.    Vascular: Dorsalis Pedis and Posterior Tibial pulses 0/4.    MSK: Left BKA        Assessment and Plan:   Pt with right medial 1st mpj superficial fibrotic ulcer and dorsal 1st toe superficial fibrotic ulcer  Chart reviewed and Patient evaluated  Discussed diagnosis and treatment with patient  lidocaine patch 5% applied.  Recommend vascular consult- no palpable pulses, cyanotic and cool right foot and history of Left BKA.   right foot xray order- r/o OM  podiatry will f/u

## 2018-05-22 NOTE — PROGRESS NOTE ADULT - ATTENDING COMMENTS
Patient seen and examined independently. Agree with resident note/ history / physical exam and plan of care with no exceptions. Case discussed with house-staff, nursing and patient/pt decision maker.   today pt is uncomfortable because of bloating and gas. no pain. sob is better  Vital Signs Last 24 Hrs  T(C): 36.4 (22 May 2018 08:34), Max: 36.7 (21 May 2018 16:38)  T(F): 97.6 (22 May 2018 08:34), Max: 98 (21 May 2018 16:38)  HR: 108 (22 May 2018 08:34) (68 - 108)  BP: 118/65 (22 May 2018 08:34) (115/62 - 118/66)  BP(mean): --  RR: 20 (22 May 2018 08:34) (18 - 20)  SpO2: 97% (22 May 2018 08:34) (97% - 99%)    cardiology note appreciated. cannot switch to NOVAC bc of ZAC . when the kidney function improves or at least stabilizes we can consider it. at this point will cont heparin drip and coumadin and monitor INR and PTT .

## 2018-05-22 NOTE — PATIENT PROFILE ADULT. - ALCOHOL USE HISTORY SINGLE SELECT
Behavioral Health Progress Note      2/16/2017    Rafa Pretty    Current Diagnosis:  Depression from cocaine, use severe      Report from the nursing staff changes in the medical condition while patient has been on the unit: pleasant cooperative     Patient Vitals for the past 24 hrs:   Temp Pulse Resp BP   02/16/17 0800 97.5 °F (36.4 °C) 94 18 (!) 149/102       No results found for this or any previous visit (from the past 24 hour(s)). Sleep:shows no evidence of impairment    Intake: nl    Patient Comments:  \" I'm still a little tired in the morning \"      Mental Status Exam:    Unremarkable except    Sensorium  oriented to time, place and person   Orientation person, place and time/date   Relations cooperative   Eye Contact appropriate   Appearance:  age appropriate, bearded and casually dressed   Motor Behavior:  within normal limits   Speech:  normal pitch and normal volume   Vocabulary average   Thought Process: goal directed   Thought Content Hallucinations- internal voices are receding     Suicidal ideations contracts for safety   Homicidal ideations none   Mood:  anxious and depressed   Affect:  anxious and depressed   Memory recent  adequate   Memory remote:  adequate   Concentration:  adequate   Abstraction:  abstract   Insight:  fair   Reliability fair   Judgment:  fair     Medications:     Medications Discontinued During This Encounter   Medication Reason    ibuprofen (MOTRIN) tablet 600 mg     ARIPiprazole (ABILIFY) tablet 10 mg        Treatment Plan:   We have lowered pt's Abilify from 10 to 5 mg in the hope of increasing his energy and relieving fatigue and internal voices are receeding in any event      Anticipated Discharge:  Pursuing out pt rehab       Adriana Allen MD  2/16/2017 never

## 2018-05-22 NOTE — PROGRESS NOTE ADULT - ASSESSMENT
Systolic CHF  Prior cath reviewed - patent grafts with severe 3-vessel disease  Renal insufficiency (acute on chronic)  LV thrombus  Fluid overloaded.      Recommend: C/w IV Lasix.  EP follow-up (Dr. Sosa) to assess for upgrade to BiV, generator change  Anticoagulation - can use renal dose DOAC  Previously intolerant of BB - multiple prior attempts, if able to tolerate, will be preferable, but otherwise restart Corlanor for rate control.

## 2018-05-22 NOTE — CONSULT NOTE ADULT - ATTENDING COMMENTS
77 year old Type 2 DM male known to podiatry,presents with exacerbation of CHF. Right foot with ischemic ulcer noted at ist MPJ 0.5 cm diameter,full thickness skin and negative probe to bone. Left BKA, pain secondary to critical limb ischemia right foot. lidaderm patch applied to right foot. Vascular notified as to patient location.

## 2018-05-22 NOTE — PROGRESS NOTE ADULT - ASSESSMENT
78 yo male with HFrEF s/p AICD/PPM, CAD s/p MI and CABG 2001, Gar's esophagus, Raynaud's PAD/PVD s/p L BKA presenting with SOB / CHF exacerbation     ·	ZAC on CKD 3(patient said his baseline creatinine is 1.5) rule out CRS type 2/ diabetic nephropathy  ·	Continue lasix IV current (60 mg q12h)  ·	Add metolazone 2.5 mg q24h   ·	check BMP and IP in AM   ·	strict I and O  ·	renal bladder sono noted no hydro   ·	Avoid nephrotoxins avoid hypotension   ·	ASPVD sp left BKA   ·	CAD/ CHF appreciate cardio notes   ·	left ventricle thrombus on coumadin/ UFH   ·	appreciate cardio notes     D/w HS

## 2018-05-23 LAB
ALBUMIN SERPL ELPH-MCNC: 2.7 G/DL — LOW (ref 3.5–5.2)
ALP SERPL-CCNC: 95 U/L — SIGNIFICANT CHANGE UP (ref 30–115)
ALT FLD-CCNC: 15 U/L — SIGNIFICANT CHANGE UP (ref 0–41)
ANION GAP SERPL CALC-SCNC: 14 MMOL/L — SIGNIFICANT CHANGE UP (ref 7–14)
APTT BLD: 126.8 SEC — CRITICAL HIGH (ref 27–39.2)
APTT BLD: 63.2 SEC — HIGH (ref 27–39.2)
AST SERPL-CCNC: 30 U/L — SIGNIFICANT CHANGE UP (ref 0–41)
BILIRUB SERPL-MCNC: 0.6 MG/DL — SIGNIFICANT CHANGE UP (ref 0.2–1.2)
BUN SERPL-MCNC: 75 MG/DL — CRITICAL HIGH (ref 10–20)
CALCIUM SERPL-MCNC: 7.9 MG/DL — LOW (ref 8.5–10.1)
CHLORIDE SERPL-SCNC: 97 MMOL/L — LOW (ref 98–110)
CO2 SERPL-SCNC: 29 MMOL/L — SIGNIFICANT CHANGE UP (ref 17–32)
CREAT SERPL-MCNC: 2.1 MG/DL — HIGH (ref 0.7–1.5)
ESTIMATED AVERAGE GLUCOSE: 146 MG/DL — HIGH (ref 68–114)
GLUCOSE SERPL-MCNC: 142 MG/DL — HIGH (ref 70–99)
HBA1C BLD-MCNC: 6.7 % — HIGH (ref 4–5.6)
HCT VFR BLD CALC: 46.3 % — SIGNIFICANT CHANGE UP (ref 42–52)
HGB BLD-MCNC: 14.3 G/DL — SIGNIFICANT CHANGE UP (ref 14–18)
MCHC RBC-ENTMCNC: 24.8 PG — LOW (ref 27–31)
MCHC RBC-ENTMCNC: 30.9 G/DL — LOW (ref 32–37)
MCV RBC AUTO: 80.4 FL — SIGNIFICANT CHANGE UP (ref 80–94)
NRBC # BLD: 0 /100 WBCS — SIGNIFICANT CHANGE UP (ref 0–0)
PHOSPHATE SERPL-MCNC: 6.3 MG/DL — HIGH (ref 2.1–4.9)
PLATELET # BLD AUTO: 238 K/UL — SIGNIFICANT CHANGE UP (ref 130–400)
POTASSIUM SERPL-MCNC: 3.6 MMOL/L — SIGNIFICANT CHANGE UP (ref 3.5–5)
POTASSIUM SERPL-SCNC: 3.6 MMOL/L — SIGNIFICANT CHANGE UP (ref 3.5–5)
PROT SERPL-MCNC: 5.2 G/DL — LOW (ref 6–8)
RBC # BLD: 5.76 M/UL — SIGNIFICANT CHANGE UP (ref 4.7–6.1)
RBC # FLD: 18.4 % — HIGH (ref 11.5–14.5)
SODIUM SERPL-SCNC: 140 MMOL/L — SIGNIFICANT CHANGE UP (ref 135–146)
WBC # BLD: 8.32 K/UL — SIGNIFICANT CHANGE UP (ref 4.8–10.8)
WBC # FLD AUTO: 8.32 K/UL — SIGNIFICANT CHANGE UP (ref 4.8–10.8)

## 2018-05-23 RX ORDER — HEPARIN SODIUM 5000 [USP'U]/ML
1000 INJECTION INTRAVENOUS; SUBCUTANEOUS
Qty: 25000 | Refills: 0 | Status: DISCONTINUED | OUTPATIENT
Start: 2018-05-23 | End: 2018-05-24

## 2018-05-23 RX ORDER — NYSTATIN CREAM 100000 [USP'U]/G
1 CREAM TOPICAL
Qty: 0 | Refills: 0 | Status: DISCONTINUED | OUTPATIENT
Start: 2018-05-23 | End: 2018-05-25

## 2018-05-23 RX ORDER — WARFARIN SODIUM 2.5 MG/1
5 TABLET ORAL ONCE
Qty: 0 | Refills: 0 | Status: COMPLETED | OUTPATIENT
Start: 2018-05-23 | End: 2018-05-23

## 2018-05-23 RX ADMIN — WARFARIN SODIUM 5 MILLIGRAM(S): 2.5 TABLET ORAL at 21:33

## 2018-05-23 RX ADMIN — FAMOTIDINE 20 MILLIGRAM(S): 10 INJECTION INTRAVENOUS at 13:07

## 2018-05-23 RX ADMIN — Medication 60 MILLIGRAM(S): at 19:41

## 2018-05-23 RX ADMIN — Medication 81 MILLIGRAM(S): at 13:08

## 2018-05-23 RX ADMIN — SIMETHICONE 80 MILLIGRAM(S): 80 TABLET, CHEWABLE ORAL at 21:33

## 2018-05-23 RX ADMIN — SIMETHICONE 80 MILLIGRAM(S): 80 TABLET, CHEWABLE ORAL at 13:08

## 2018-05-23 RX ADMIN — NYSTATIN CREAM 1 APPLICATION(S): 100000 CREAM TOPICAL at 19:39

## 2018-05-23 RX ADMIN — SIMETHICONE 80 MILLIGRAM(S): 80 TABLET, CHEWABLE ORAL at 05:58

## 2018-05-23 NOTE — PROGRESS NOTE ADULT - ASSESSMENT
76 yo male with HFrEF s/p AICD/PPM, CAD s/p MI and CABG 2001, Gar's esophagus, Raynaud's PAD/PVD s/p L BKA presents with gradually worsening FRIAS for approximately 1 week. Admitted for CHF exacerbation, AICD battery end life and ZAC.      # SOB likely 2/2 decompensated HF  - c/w iv diuresis 60 m g q 24  - will optimize medical management as pt initially reported he was taking lasix every other day at home.  - Repeat echo showed EF of <20%    # LV thrombus  - pt on IV Heparin, xeralto cannot be considered at this time because of worsening renal function new ZAC on CKD,   - will c/w coumadin, will bridge with heparin until INR is therapeutic.  - will dose for coumadin tonight    # AICD malfunction:  -Device interrogation by EPS done 5/21  - needs an upgrade to BIV and generator change , was told in past is high rsik for general anesthesia in past, Dr. Sosa is consulted    # ZAC on CKD versus progression of CKD or cardiorenal syndrome: baseline 1.4-1.5 in 2017  - creatinine is trending now 2.2  - no hydro on USG    # Left BKA  -stump is clean no erythema or discharge    # Right ischemic foot changes  - podiatry consulted  - vascular consulted    GI ppx: on pepcid 20 mg.  DVT ppx: heparin bridging coumadin    DISPO: home, full code

## 2018-05-23 NOTE — CONSULT NOTE ADULT - CONSULT REASON
CHF
Right foot erythema and edema, left BKA leg edema
ZAC vs CKD
foot ulcer
AICD lead malfunctioning

## 2018-05-23 NOTE — CONSULT NOTE ADULT - ASSESSMENT
78 yo male with HFrEF s/p AICD/PPM, CAD s/p MI and CABG 2001, Gar's esophagus, Raynaud's PAD/PVD s/p L BKA 2015, Right lower extremity angiogram in 2015: patent CFA, SFA, profunda, occluded TPT with reconstitution to 1V PT, unable to cross the lesion in 2015, presents with gradually worsening FRIAS for approximately 1 week, AICD is at end of life, and a newly     Vascular surgery called for evaluation of Right lower extremity: erythema and edema with open wounds and toe necrosis    - obtain venous and arterial duplexes  - elevate extremities to improve the swelling   - diuretics to off load the fluid  - will d/w  Sschor      SPECTRA [id number] 78 yo male with HFrEF s/p AICD/PPM, CAD s/p MI and CABG 2001, Gar's esophagus, Raynaud's PAD/PVD s/p L BKA 2015, Right lower extremity angiogram in 2015: patent CFA, SFA, profunda, occluded TPT with reconstitution to 1V PT, unable to cross the lesion in 2015, presents with gradually worsening FRIAS for approximately 1 week, AICD is at end of life, and a newly found LV clot.  Admitted for CHF exacerbation, AICD battery end life and ZAC      Vascular surgery called for evaluation of Right lower extremity: erythema and edema with open wounds and toe necrosis    - obtain venous and arterial duplexes  - elevate extremities to improve the swelling   - diuretics to off load the fluid  - will d/w  Sschor      SPECTRA [id number]

## 2018-05-23 NOTE — CONSULT NOTE ADULT - SUBJECTIVE AND OBJECTIVE BOX
VASCULAR SURGERY CONSULT NOTE    HPI:  78 yo male with HFrEF s/p AICD/PPM, CAD s/p MI and CABG 2001, Gar's esophagus, Raynaud's PAD/PVD s/p L BKA 2015, Right lower extremity angiogram in 2015: patent CFA, SFA, profunda, occluded TPT with reconstitution to 1V PT, unable to cross the lesion in 2015, presents with gradually worsening FRIAS for approximately 1 week. Patient states that he feels fine at rest, however becomes SOB when moving or changing positions. He cannot walk a block and it takes him a very long time to walk up a few steps in his house. He denies any CP, palpitations, cough, dizziness or lightheadedness. Of note, patient saw his EP Dr Sosa a few months ago, and was told that his AICD battery is nearing end of life and that one of the leads is not working properly. In the ED, he was found to be in fluid overload with a BNP 75644, mildly elevated troponins and ZAC. He did not receive any medications    Patient states that he is prescribed Lasix 80, which he takes every other day, last taken 2 days ago (19 May 2018 18:21)      Right lower extremity is worrisome to the patient since erythema seems chronic, but edema and some ulcers and toe discoloration are non-healing.         PAST MEDICAL & SURGICAL HISTORY:  Raynauds phenomenon  Endarteritis  CAD (coronary artery disease)  Stented coronary artery  MI (myocardial infarction)  Pacemaker  DM (diabetes mellitus)  Gar esophagus  CHF (congestive heart failure)  S/P CABG (coronary artery bypass graft)  S/P BKA (below knee amputation), left    Allergy Status Unknown    Home Medications:  famotidine 20 mg oral tablet: 5 tab(s) orally once a day, As Needed (19 May 2018 18:32)  Lasix 80 mg oral tablet: 1 tab(s) orally every other day (at bedtime) (19 May 2018 18:32)      REVIEW OF SYSTEMS:  GENERAL:                                         negative  SKIN/BREAST:                                  see HPI  OPTHALMOLOGIC:                          negative  ENMT:                                               negative  RESPIRATORY AND THORAX:        see HPI  CARDIOVASCULAR:                         see HPI  GASTROINTESTINAL:                       negative  MUSCULOSKELETAL:                       see HPI  NEUROLOGIC:                                   negative  PSYCHIATRIC:                                    negative  HEMATOLOGY/LYMPHATICS:         negative  ENDOCRINE:                                     negative  ALLERGIC/IMMUNOLOGIC:            negative      PHYSICAL EXAM  Vital Signs Last 24 Hrs  T(C): 36.1 (23 May 2018 05:21), Max: 36.1 (23 May 2018 05:21)  T(F): 97 (23 May 2018 05:21), Max: 97 (23 May 2018 05:21)  HR: 81 (23 May 2018 05:21) (75 - 117)  BP: 93/54 (23 May 2018 05:21) (93/54 - 121/78)  BP(mean): --  RR: 18 (23 May 2018 05:21) (18 - 18)  SpO2: --    Appearance: Normal	  HEENT:   Normal oral mucosa, PERRL, EOMI	  Neck: Supple, - JVD; Carotid Bruit   Cardiovascular: Normal S1 S2, No JVD, No murmurs,   Respiratory: Lungs clear to auscultation, No Rales, Rhonchi, Wheezing	  Gastrointestinal:  Soft, Non-tender, + BS	  Skin: left BKA: edematous stump up to knee, right: erythema, non-pitting edema, open wounds medial mpj, and necrotic 4rth toe  Neurologic: Non-focal  Psychiatry: A & O x 3, Mood & affect appropriate      PULSES:    Dorsal Pedal: no signal appreciated  Posterior Tibial: dopplerable signal      MEDICATIONS:   MEDICATIONS  (STANDING):  aspirin  chewable 81 milliGRAM(s) Oral daily  famotidine    Tablet 20 milliGRAM(s) Oral daily  furosemide   Injectable 60 milliGRAM(s) IV Push two times a day  heparin  Infusion 1000 Unit(s)/Hr (10 mL/Hr) IV Continuous <Continuous>  metolazone 2.5 milliGRAM(s) Oral daily  metoprolol tartrate 25 milliGRAM(s) Oral two times a day  simethicone 80 milliGRAM(s) Chew three times a day  warfarin 5 milliGRAM(s) Oral once    MEDICATIONS  (PRN):      LAB/STUDIES:                        14.3   8.32  )-----------( 238      ( 23 May 2018 09:42 )             46.3     05-23    140  |  97<L>  |  75<HH>  ----------------------------<  142<H>  3.6   |  29  |  2.1<H>    Ca    7.9<L>      23 May 2018 09:42  Phos  6.3     05-23    TPro  5.2<L>  /  Alb  2.7<L>  /  TBili  0.6  /  DBili  x   /  AST  30  /  ALT  15  /  AlkPhos  95  05-23    PT/INR - ( 22 May 2018 09:02 )   PT: 13.00 sec;   INR: 1.20 ratio         PTT - ( 23 May 2018 09:42 )  PTT:126.8 sec  LIVER FUNCTIONS - ( 23 May 2018 09:42 )  Alb: 2.7 g/dL / Pro: 5.2 g/dL / ALK PHOS: 95 U/L / ALT: 15 U/L / AST: 30 U/L / GGT: x                             IMAGING:      ASSESSMENT/PLAN:          SPECTRA: [id number]

## 2018-05-23 NOTE — PROGRESS NOTE ADULT - ASSESSMENT
76 yo male with HFrEF s/p AICD/PPM, CAD s/p MI and CABG 2001, Gar's esophagus, Raynaud's PAD/PVD s/p L BKA presents with gradually worsening FRIAS for approximately 1 week. Admitted for CHF exacerbation, AICD battery end life and ZAC    1.	Ac. on Ch. HFrEF  2.	ZAC/ CKD-III  3.	CAD S/P CABG  4.	LV thrombus  5.	Pacemaker lead malfunction  6.	Raynaud's disease  7.	PAD S/P Lt. BKA        PLAN:    ·	Cont tele  ·	Cont iv lasix 60 mg po q 12h and metolazone 2.5 mg po q 24h  ·	Check I'S and O'S and daily wt.  ·	Follow BMP. Baseline Cr is 1.5  ·	Cont IV heparin. Follow PTT.  ·	EP F/U for pacemaker battery change and BV pacing  ·	Vascular eval  ·	Give coumadin 5 mg po tonight and check INR in am.

## 2018-05-23 NOTE — PROGRESS NOTE ADULT - SUBJECTIVE AND OBJECTIVE BOX
LENGTH OF HOSPITAL STAY: 4d    CHIEF COMPLAINT:   Patient is a 77y old  Male who presents with a chief complaint of sob (22 May 2018 15:14)      Overnight events:    No acute events overnight    ALLERGIES:  Allergy Status Unknown    MEDICATIONS:  STANDING MEDICATIONS  aspirin  chewable 81 milliGRAM(s) Oral daily  famotidine    Tablet 20 milliGRAM(s) Oral daily  furosemide   Injectable 60 milliGRAM(s) IV Push two times a day  heparin  Infusion 1200 Unit(s)/Hr IV Continuous <Continuous>  metolazone 2.5 milliGRAM(s) Oral daily  metoprolol tartrate 25 milliGRAM(s) Oral two times a day  simethicone 80 milliGRAM(s) Chew three times a day    PRN MEDICATIONS    VITALS:   T(F): 97  HR: 81  BP: 93/54  RR: 18  SpO2: --    LABS:                        14.3   8.32  )-----------( 238      ( 23 May 2018 09:42 )             46.3     05-22    138  |  96<L>  |  76<HH>  ----------------------------<  134<H>  3.9   |  28  |  2.2<H>    Ca    7.9<L>      22 May 2018 18:20      PT/INR - ( 22 May 2018 09:02 )   PT: 13.00 sec;   INR: 1.20 ratio         PTT - ( 23 May 2018 09:42 )  PTT:126.8 sec    Cultures:      RADIOLOGY:    PHYSICAL EXAM:  GEN: No acute distress  LUNGS: Clear to auscultation bilaterally   HEART: S1/S2 present. RRR.   ABD: Soft, non-tender, non-distended. Bowel sounds present  EXt: bka of the left. ischemic changes seen on right feet, cold and decreased pulse,   NEURO: AAOX3

## 2018-05-23 NOTE — PROGRESS NOTE ADULT - SUBJECTIVE AND OBJECTIVE BOX
JOSE DGASTON WRIGHT  77y Male    INTERVAL HPI/OVERNIGHT EVENTS:  Patient seen and examined. Sitting comfortable in the bed. Denies SOB. On IV diuretics. Watching renal function.    ROS: All other systems are negative.    Vital Signs:    T(F): 97 (05-23-18 @ 05:21), Max: 97 (05-23-18 @ 05:21)  HR: 81 (05-23-18 @ 05:21) (75 - 117)  BP: 93/54 (05-23-18 @ 05:21) (93/54 - 121/78)  RR: 18 (05-23-18 @ 05:21) (18 - 18)  SpO2: --  I&O's Summary    22 May 2018 07:01  -  23 May 2018 07:00  --------------------------------------------------------  IN: 422 mL / OUT: 600 mL / NET: -178 mL    23 May 2018 07:01  -  23 May 2018 12:00  --------------------------------------------------------  IN: 354 mL / OUT: 350 mL / NET: 4 mL      Daily Height in cm: 182.88 (22 May 2018 16:00)    Daily   CAPILLARY BLOOD GLUCOSE          PHYSICAL EXAM:    GENERAL:  NAD  SKIN: No rashes or lesions  HENT: Atrumatic. Normocephalic. PERRL. Moist membranes.  NECK: Supple, No JVD. No lymphadenopathy.  PULMONARY: BS are absent in the Rt. lower chest post. No wheezing. No rales  CVS: Normal S1, S2. Rate and Rythm are regular. No murmurs.  ABDOMEN/GI: Soft, Nontender, Nondistended; BS present  EXTREMITIES: Lt. BKA. Rt. foot is cold  NEUROLOGIC:  No motor or sensory deficit.  PSYCH: Alert & oriented x 3    Consultant(s) Notes Reviewed:  [x ] YES  [ ] NO  Care Discussed with Consultants/Other Providers [ x] YES  [ ] NO    EKG reviewed  Telemetry reviewed    LABS:                        14.3   8.32  )-----------( 238      ( 23 May 2018 09:42 )             46.3     05-23    140  |  97<L>  |  75<HH>  ----------------------------<  142<H>  Creatinine Trend: 2.1<--, 2.2<--, 2.2<--, 2.2<--, 2.6<--, 2.1<--  3.6   |  29  |  2.1<H>    Ca    7.9<L>      23 May 2018 09:42  Phos  6.3     05-23    TPro  5.2<L>  /  Alb  2.7<L>  /  TBili  0.6  /  DBili  x   /  AST  30  /  ALT  15  /  AlkPhos  95  05-23    PT/INR - ( 22 May 2018 09:02 )   PT: 13.00 sec;   INR: 1.20 ratio         PTT - ( 23 May 2018 09:42 )  PTT:126.8 sec  Serum Pro-Brain Natriuretic Peptide: 65980 pg/mL (05-19-18 @ 15:45)    Trop 0.12, CKMB --, , 05-21-18 @ 09:20        RADIOLOGY & ADDITIONAL TESTS:      Imaging or report Personally Reviewed:  [ ] YES  [ ] NO    Medications:  Standing  aspirin  chewable 81 milliGRAM(s) Oral daily  famotidine    Tablet 20 milliGRAM(s) Oral daily  furosemide   Injectable 60 milliGRAM(s) IV Push two times a day  heparin  Infusion 1000 Unit(s)/Hr IV Continuous <Continuous>  metolazone 2.5 milliGRAM(s) Oral daily  metoprolol tartrate 25 milliGRAM(s) Oral two times a day  simethicone 80 milliGRAM(s) Chew three times a day  warfarin 5 milliGRAM(s) Oral once    PRN Meds      Case discussed with resident    Care discussed with pt/family

## 2018-05-24 ENCOUNTER — APPOINTMENT (OUTPATIENT)
Dept: CARDIOLOGY | Facility: CLINIC | Age: 78
End: 2018-05-24

## 2018-05-24 LAB
ANION GAP SERPL CALC-SCNC: 15 MMOL/L — HIGH (ref 7–14)
APTT BLD: 84.1 SEC — CRITICAL HIGH (ref 27–39.2)
BASOPHILS # BLD AUTO: 0.05 K/UL — SIGNIFICANT CHANGE UP (ref 0–0.2)
BASOPHILS NFR BLD AUTO: 0.6 % — SIGNIFICANT CHANGE UP (ref 0–1)
BUN SERPL-MCNC: 85 MG/DL — CRITICAL HIGH (ref 10–20)
CALCIUM SERPL-MCNC: 7.9 MG/DL — LOW (ref 8.5–10.1)
CHLORIDE SERPL-SCNC: 95 MMOL/L — LOW (ref 98–110)
CO2 SERPL-SCNC: 30 MMOL/L — SIGNIFICANT CHANGE UP (ref 17–32)
CREAT SERPL-MCNC: 2.1 MG/DL — HIGH (ref 0.7–1.5)
EOSINOPHIL # BLD AUTO: 0.22 K/UL — SIGNIFICANT CHANGE UP (ref 0–0.7)
EOSINOPHIL NFR BLD AUTO: 2.8 % — SIGNIFICANT CHANGE UP (ref 0–8)
GLUCOSE SERPL-MCNC: 117 MG/DL — HIGH (ref 70–99)
HCT VFR BLD CALC: 40.7 % — LOW (ref 42–52)
HGB BLD-MCNC: 12.6 G/DL — LOW (ref 14–18)
IMM GRANULOCYTES NFR BLD AUTO: 0.4 % — HIGH (ref 0.1–0.3)
INR BLD: 2.2 RATIO — HIGH (ref 0.65–1.3)
LYMPHOCYTES # BLD AUTO: 0.7 K/UL — LOW (ref 1.2–3.4)
LYMPHOCYTES # BLD AUTO: 8.8 % — LOW (ref 20.5–51.1)
MCHC RBC-ENTMCNC: 24.8 PG — LOW (ref 27–31)
MCHC RBC-ENTMCNC: 31 G/DL — LOW (ref 32–37)
MCV RBC AUTO: 80 FL — SIGNIFICANT CHANGE UP (ref 80–94)
MONOCYTES # BLD AUTO: 1.44 K/UL — HIGH (ref 0.1–0.6)
MONOCYTES NFR BLD AUTO: 18 % — HIGH (ref 1.7–9.3)
NEUTROPHILS # BLD AUTO: 5.56 K/UL — SIGNIFICANT CHANGE UP (ref 1.4–6.5)
NEUTROPHILS NFR BLD AUTO: 69.4 % — SIGNIFICANT CHANGE UP (ref 42.2–75.2)
NRBC # BLD: 0 /100 WBCS — SIGNIFICANT CHANGE UP (ref 0–0)
PLATELET # BLD AUTO: 250 K/UL — SIGNIFICANT CHANGE UP (ref 130–400)
POTASSIUM SERPL-MCNC: 3.6 MMOL/L — SIGNIFICANT CHANGE UP (ref 3.5–5)
POTASSIUM SERPL-SCNC: 3.6 MMOL/L — SIGNIFICANT CHANGE UP (ref 3.5–5)
PROTHROM AB SERPL-ACNC: 24.2 SEC — HIGH (ref 9.95–12.87)
RBC # BLD: 5.09 M/UL — SIGNIFICANT CHANGE UP (ref 4.7–6.1)
RBC # FLD: 17.6 % — HIGH (ref 11.5–14.5)
SODIUM SERPL-SCNC: 140 MMOL/L — SIGNIFICANT CHANGE UP (ref 135–146)
WBC # BLD: 8 K/UL — SIGNIFICANT CHANGE UP (ref 4.8–10.8)
WBC # FLD AUTO: 8 K/UL — SIGNIFICANT CHANGE UP (ref 4.8–10.8)

## 2018-05-24 RX ORDER — WARFARIN SODIUM 2.5 MG/1
5 TABLET ORAL ONCE
Qty: 0 | Refills: 0 | Status: COMPLETED | OUTPATIENT
Start: 2018-05-24 | End: 2018-05-24

## 2018-05-24 RX ADMIN — NYSTATIN CREAM 1 APPLICATION(S): 100000 CREAM TOPICAL at 06:50

## 2018-05-24 RX ADMIN — Medication 81 MILLIGRAM(S): at 11:29

## 2018-05-24 RX ADMIN — WARFARIN SODIUM 5 MILLIGRAM(S): 2.5 TABLET ORAL at 21:37

## 2018-05-24 RX ADMIN — Medication 60 MILLIGRAM(S): at 17:18

## 2018-05-24 RX ADMIN — SIMETHICONE 80 MILLIGRAM(S): 80 TABLET, CHEWABLE ORAL at 21:37

## 2018-05-24 RX ADMIN — FAMOTIDINE 20 MILLIGRAM(S): 10 INJECTION INTRAVENOUS at 11:29

## 2018-05-24 RX ADMIN — SIMETHICONE 80 MILLIGRAM(S): 80 TABLET, CHEWABLE ORAL at 13:06

## 2018-05-24 RX ADMIN — SIMETHICONE 80 MILLIGRAM(S): 80 TABLET, CHEWABLE ORAL at 06:51

## 2018-05-24 RX ADMIN — NYSTATIN CREAM 1 APPLICATION(S): 100000 CREAM TOPICAL at 17:18

## 2018-05-24 NOTE — PROGRESS NOTE ADULT - SUBJECTIVE AND OBJECTIVE BOX
GASTON AZEVEDO  77y Male    INTERVAL HPI/OVERNIGHT EVENTS:  Patient seen and examined. C/O rash and severe pain in both groins. No SOB    ROS: All other systems are negative.    Vital Signs:    T(F): 97 (18 @ 05:35), Max: 97 (18 @ 05:35)  HR: 81 (18 @ 05:35) (80 - 87)  BP: 88/60 (18 @ 05:35) (88/60 - 166/62)  RR: 18 (18 @ 05:35) (18 - 18)  SpO2: 98% (18 @ 05:26) (98% - 98%)  I&O's Summary    23 May 2018 07:  -  24 May 2018 07:00  --------------------------------------------------------  IN: 784 mL / OUT: 1700 mL / NET: -916 mL    24 May 2018 07:01  -  24 May 2018 11:27  --------------------------------------------------------  IN: 240 mL / OUT: 250 mL / NET: -10 mL      Daily     Daily Weight in k (24 May 2018 05:35)  CAPILLARY BLOOD GLUCOSE          PHYSICAL EXAM:    GENERAL:  NAD  SKIN: Fungal rash in both groins  HENT: Atrumatic. Normocephalic. PERRL. Moist membranes.  NECK: Supple, No JVD. No lymphadenopathy.  PULMONARY: BS are absent in the Rt. lower chest post.  CVS: Normal S1, S2. Rate and Rythm are regular. No murmurs.  ABDOMEN/GI: Soft, Nontender, Nondistended; BS present  EXTREMITIES: Lt BKA. Rt. foot is cold  NEUROLOGIC:  No motor or sensory deficit.  PSYCH: Alert & oriented x 3    Consultant(s) Notes Reviewed:  [x ] YES  [ ] NO  Care Discussed with Consultants/Other Providers [ x] YES  [ ] NO    EKG reviewed  Telemetry reviewed    LABS:                        12.6   8.00  )-----------( 250      ( 24 May 2018 05:42 )             40.7         140  |  95<L>  |  85<HH>  ----------------------------<  117<H>   Creatinine Trend: 2.1<--, 2.1<--, 2.2<--, 2.2<--, 2.2<--, 2.6<--  3.6   |  30  |  2.1<H>    Ca    7.9<L>      24 May 2018 05:42  Phos  6.3         TPro  5.2<L>  /  Alb  2.7<L>  /  TBili  0.6  /  DBili  x   /  AST  30  /  ALT  15  /  AlkPhos  95  23    PT/INR - ( 24 May 2018 05:42 )   PT: 24.20 sec;   INR: 2.20 ratio         PTT - ( 24 May 2018 05:42 )  PTT:84.1 sec  Serum Pro-Brain Natriuretic Peptide: 35240 pg/mL (18 @ 15:45)          RADIOLOGY & ADDITIONAL TESTS:      Imaging or report Personally Reviewed:  [ ] YES  [ ] NO    Medications:  Standing  aspirin  chewable 81 milliGRAM(s) Oral daily  famotidine    Tablet 20 milliGRAM(s) Oral daily  furosemide   Injectable 60 milliGRAM(s) IV Push two times a day  metolazone 2.5 milliGRAM(s) Oral daily  metoprolol tartrate 25 milliGRAM(s) Oral two times a day  nystatin Powder 1 Application(s) Topical two times a day  simethicone 80 milliGRAM(s) Chew three times a day  warfarin 5 milliGRAM(s) Oral once    PRN Meds      Case discussed with resident    Care discussed with pt/family

## 2018-05-24 NOTE — PROGRESS NOTE ADULT - SUBJECTIVE AND OBJECTIVE BOX
Patient evaluated bedside this AM,admits to rest pain in right foot. small ischemic ulcer noted area of first MPJ right foot ,ischemic changes noted to forefoot right. Lidaderm patch applied to right foot for pain,xeroform to right hallux.                      12.6   8.00  )-----------( 250      ( 24 May 2018 05:42 )             40.7   Vital Signs Last 24 Hrs  T(C): 36.1 (24 May 2018 05:35), Max: 36.1 (24 May 2018 05:35)  T(F): 97 (24 May 2018 05:35), Max: 97 (24 May 2018 05:35)  HR: 81 (24 May 2018 05:35) (80 - 87)  BP: 88/60 (24 May 2018 05:35) (88/60 - 166/62)  BP(mean): --  RR: 18 (24 May 2018 05:35) (18 - 18)  SpO2: --

## 2018-05-24 NOTE — PROGRESS NOTE ADULT - ASSESSMENT
78 yo male with HFrEF s/p AICD/PPM, CAD s/p MI and CABG 2001, Gar's esophagus, Raynaud's PAD/PVD s/p L BKA presenting with SOB / CHF exacerbation     ·	ZAC on CKD 3(patient said his baseline creatinine is 1.5) rule out CRS type 2/ diabetic nephropathy  ·	Continue lasix IV current (60 mg q12h) and  metolazone 2.5 mg q24h   ·	check BMP and IP in AM   ·	strict I and O  ·	renal bladder sono noted no hydro   ·	Avoid nephrotoxins avoid hypotension   ·	ASPVD sp left BKA   ·	CAD/ CHF appreciate cardio notes   ·	left ventricle thrombus on coumadin/ UFH   ·	appreciate cardio notes     D/w HS 76 yo male with HFrEF s/p AICD/PPM, CAD s/p MI and CABG 2001, Gar's esophagus, Raynaud's PAD/PVD s/p L BKA presenting with SOB / CHF exacerbation     ·	ZAC on CKD 3(patient said his baseline creatinine is 1.5) rule out CRS type 2/ diabetic nephropathy  ·	Continue lasix IV current (60 mg q12h) and  metolazone 2.5 mg q24h / switch lasix to po in AM   ·	check BMP and IP in AM   ·	strict I and O  ·	renal bladder sono noted no hydro   ·	Avoid nephrotoxins avoid hypotension   ·	ASPVD sp left BKA   ·	CAD/ CHF appreciate cardio notes   ·	left ventricle thrombus on coumadin/ UFH   ·	appreciate cardio notes

## 2018-05-24 NOTE — PROGRESS NOTE ADULT - SUBJECTIVE AND OBJECTIVE BOX
LENGTH OF HOSPITAL STAY: 5d    CHIEF COMPLAINT:   Patient is a 77y old  Male who presents with a chief complaint of sob (22 May 2018 15:14)      Overnight events:    No acute events overnight    ALLERGIES:  Allergy Status Unknown    MEDICATIONS:  STANDING MEDICATIONS  aspirin  chewable 81 milliGRAM(s) Oral daily  famotidine    Tablet 20 milliGRAM(s) Oral daily  furosemide   Injectable 60 milliGRAM(s) IV Push two times a day  heparin  Infusion 1000 Unit(s)/Hr IV Continuous <Continuous>  metolazone 2.5 milliGRAM(s) Oral daily  metoprolol tartrate 25 milliGRAM(s) Oral two times a day  nystatin Powder 1 Application(s) Topical two times a day  simethicone 80 milliGRAM(s) Chew three times a day    PRN MEDICATIONS    VITALS:   T(F): 97  HR: 81  BP: 88/60  RR: 18  SpO2: 98%    LABS:                        12.6   8.00  )-----------( 250      ( 24 May 2018 05:42 )             40.7     05-24    140  |  95<L>  |  85<HH>  ----------------------------<  117<H>  3.6   |  30  |  2.1<H>    Ca    7.9<L>      24 May 2018 05:42  Phos  6.3     05-23    TPro  5.2<L>  /  Alb  2.7<L>  /  TBili  0.6  /  DBili  x   /  AST  30  /  ALT  15  /  AlkPhos  95  05-23    PT/INR - ( 24 May 2018 05:42 )   PT: 24.20 sec;   INR: 2.20 ratio         PTT - ( 24 May 2018 05:42 )  PTT:84.1 sec      Cultures:      RADIOLOGY:    PHYSICAL EXAM:  GEN: No acute distress  LUNGS: Clear to auscultation bilaterally   HEART: S1/S2 present. RRR.   ABD: Soft, non-tender, non-distended. Bowel sounds present  Ext: right toe necrosis, left bka  NEURO: AAOX3

## 2018-05-24 NOTE — PROGRESS NOTE ADULT - SUBJECTIVE AND OBJECTIVE BOX
Nephrology progress note    Patient is seen and examined, events over the last 24 h noted .    Allergies:  Allergy Status Unknown    Hospital Medications:   MEDICATIONS  (STANDING):  aspirin  chewable 81 milliGRAM(s) Oral daily  famotidine    Tablet 20 milliGRAM(s) Oral daily  furosemide   Injectable 60 milliGRAM(s) IV Push two times a day  metolazone 2.5 milliGRAM(s) Oral daily  metoprolol tartrate 25 milliGRAM(s) Oral two times a day  nystatin Powder 1 Application(s) Topical two times a day  simethicone 80 milliGRAM(s) Chew three times a day  warfarin 5 milliGRAM(s) Oral once        VITALS:  T(F): 97 (05-24-18 @ 05:35), Max: 97 (05-24-18 @ 05:35)  HR: 81 (05-24-18 @ 05:35)  BP: 88/60 (05-24-18 @ 05:35)  RR: 18 (05-24-18 @ 05:35)  SpO2: 98% (05-24-18 @ 05:26)      05-22 @ 07:01  -  05-23 @ 07:00  --------------------------------------------------------  IN: 422 mL / OUT: 600 mL / NET: -178 mL    05-23 @ 07:01  -  05-24 @ 07:00  --------------------------------------------------------  IN: 784 mL / OUT: 1700 mL / NET: -916 mL    05-24 @ 07:01  -  05-24 @ 11:03  --------------------------------------------------------  IN: 240 mL / OUT: 250 mL / NET: -10 mL          PHYSICAL EXAM:  Constitutional: NAD  HEENT: anicteric sclera, oropharynx clear, MMM  Neck: No JVD  Respiratory: CTAB, no wheezes, rales or rhonchi  Cardiovascular: S1, S2, RRR  Gastrointestinal: BS+, soft, NT/ND  Extremities: No cyanosis or clubbing. No peripheral edema  :  No delcid.   Skin: No rashes    LABS:  05-24    140  |  95<L>  |  85<HH>  ----------------------------<  117<H>  3.6   |  30  |  2.1<H>  Creatinine Trend: 2.1<--, 2.1<--, 2.2<--, 2.2<--, 2.2<--, 2.6<--  Ca    7.9<L>      24 May 2018 05:42  Phos  6.3     05-23    TPro  5.2<L>  /  Alb  2.7<L>  /  TBili  0.6  /  DBili      /  AST  30  /  ALT  15  /  AlkPhos  95  05-23                          12.6   8.00  )-----------( 250      ( 24 May 2018 05:42 )             40.7       Urine Studies:      RADIOLOGY & ADDITIONAL STUDIES: Nephrology progress note    Patient is seen and examined, events over the last 24 h noted .  Said swelling lower ext is better  no SOB     Allergies:  Allergy Status Unknown    Hospital Medications:   MEDICATIONS  (STANDING):  aspirin  chewable 81 milliGRAM(s) Oral daily  famotidine    Tablet 20 milliGRAM(s) Oral daily  furosemide   Injectable 60 milliGRAM(s) IV Push two times a day  metolazone 2.5 milliGRAM(s) Oral daily  metoprolol tartrate 25 milliGRAM(s) Oral two times a day  nystatin Powder 1 Application(s) Topical two times a day  simethicone 80 milliGRAM(s) Chew three times a day  warfarin 5 milliGRAM(s) Oral once        VITALS:  T(F): 97 (05-24-18 @ 05:35), Max: 97 (05-24-18 @ 05:35)  HR: 81 (05-24-18 @ 05:35)  BP: 88/60 (05-24-18 @ 05:35)  RR: 18 (05-24-18 @ 05:35)  SpO2: 98% (05-24-18 @ 05:26)      05-22 @ 07:01  -  05-23 @ 07:00  --------------------------------------------------------  IN: 422 mL / OUT: 600 mL / NET: -178 mL    05-23 @ 07:01  -  05-24 @ 07:00  --------------------------------------------------------  IN: 784 mL / OUT: 1700 mL / NET: -916 mL    05-24 @ 07:01  -  05-24 @ 11:03  --------------------------------------------------------  IN: 240 mL / OUT: 250 mL / NET: -10 mL          PHYSICAL EXAM:  Constitutional: NAD  HEENT: anicteric sclera, oropharynx clear, MMM  Neck: No JVD  Respiratory: CTAB, no wheezes, rales or rhonchi  Cardiovascular: S1, S2, RRR  Gastrointestinal: BS+, soft, NT/ND  Extremities: No cyanosis or clubbing. No peripheral edema, left BKA   :  No delcid.   Skin: No rashes    LABS:  05-24    140  |  95<L>  |  85<HH>  ----------------------------<  117<H>  3.6   |  30  |  2.1<H>  Creatinine Trend: 2.1<--, 2.1<--, 2.2<--, 2.2<--, 2.2<--, 2.6<--  Ca    7.9<L>      24 May 2018 05:42  Phos  6.3     05-23    TPro  5.2<L>  /  Alb  2.7<L>  /  TBili  0.6  /  DBili      /  AST  30  /  ALT  15  /  AlkPhos  95  05-23                          12.6   8.00  )-----------( 250      ( 24 May 2018 05:42 )             40.7       Urine Studies:      RADIOLOGY & ADDITIONAL STUDIES:

## 2018-05-24 NOTE — PROGRESS NOTE ADULT - ASSESSMENT
78 yo male with HFrEF s/p AICD/PPM, CAD s/p MI and CABG 2001, Gar's esophagus, Raynaud's PAD/PVD s/p L BKA presents with gradually worsening FRIAS for approximately 1 week. Admitted for CHF exacerbation, AICD battery end life and ZAC.      # SOB likely 2/2 decompensated HF  - c/w IV diuresis 60 m g q 24  - will optimize medical management as pt initially reported he was taking lasix every other day at home.  - Repeat echo showed EF of <20%    # LV thrombus  - pt on IV Heparin, xeralto cannot be considered at this time because of worsening renal function new ZAC on CKD,   - coumadin, now therapeutic, d/c heparin    # AICD malfunction:  -Device interrogation by EPS done 5/21  - needs an upgrade to BIV and generator change , was told in past is high rsik for general anesthesia in past  - f/u Dr. Sosa is consulted    # ZAC on CKD versus progression of CKD or cardiorenal syndrome: baseline 1.4-1.5 in 2017  - creatinine is trending now 2.2  - no hydro on USG    # Left BKA  -stump is clean no erythema or discharge    # Right ischemic foot changes  - podiatry and vascular saw  - f/u arterial and venous duplex    GI ppx: on pepcid 20 mg.  DVT ppx: coumadin    DISPO: home, full code

## 2018-05-24 NOTE — PROGRESS NOTE ADULT - ASSESSMENT
AICD  - device interrogated generator at [facility], report in paper chart, will follow as outpatient  - continue current medical therapies

## 2018-05-24 NOTE — PROGRESS NOTE ADULT - SUBJECTIVE AND OBJECTIVE BOX
INTERVAL HPI/OVERNIGHT EVENTS:    Pt seen and examined no complaints no events on tele    MEDICATIONS  (STANDING):  aspirin  chewable 81 milliGRAM(s) Oral daily  famotidine    Tablet 20 milliGRAM(s) Oral daily  furosemide   Injectable 60 milliGRAM(s) IV Push two times a day  metolazone 2.5 milliGRAM(s) Oral daily  metoprolol tartrate 25 milliGRAM(s) Oral two times a day  nystatin Powder 1 Application(s) Topical two times a day  simethicone 80 milliGRAM(s) Chew three times a day  warfarin 5 milliGRAM(s) Oral once    MEDICATIONS  (PRN):    Allergies  Allergy Status Unknown    Vital Signs Last 24 Hrs  T(C): 35.3 (24 May 2018 14:45), Max: 36.1 (24 May 2018 05:35)  T(F): 95.6 (24 May 2018 14:45), Max: 97 (24 May 2018 05:35)  HR: 98 (24 May 2018 16:48) (81 - 99)  BP: 105/63 (24 May 2018 16:48) (88/60 - 114/65)  BP(mean): --  RR: 18 (24 May 2018 16:48) (18 - 18)  SpO2: 98% (24 May 2018 05:26) (98% - 98%)    05-23-18 @ 07:01 - 05-24-18 @ 07:00  --------------------------------------------------------  IN: 784 mL / OUT: 1700 mL / NET: -916 mL    05-24-18 @ 07:01  - 05-24-18 @ 17:58  --------------------------------------------------------  IN: 450 mL / OUT: 450 mL / NET: 0 mL      Physical Exam    GENERAL: In no apparent distress, well nourished, and hydrated.  HEAD:  Atraumatic, Normocephalic  EYES: EOMI, PERRLA, conjunctiva and sclera clear  ENMT: No tonsillar erythema, exudates, or enlargements; ist mucous membranes, Good dentition, No lesions  NECK: Supple and normal thyroid.  No JVD or carotid bruit.  Carotid pulse is 2+ bilaterally.  HEART: Regular rate and rhythm; No murmurs, rubs, or gallops.  PULMONARY: Clear to auscultation and perfusion.  No rales, wheezing, or rhonchi bilaterally.  ABDOMEN: Soft, Nontender, Nondistended; Bowel sounds present  EXTREMITIES:  2+ Peripheral Pulses, No clubbing, cyanosis, or edema  LYMPH: No lymphadenopathy noted  NEUROLOGICAL: Grossly nonfocal    LABS:                      12.6   8.00  )-----------( 250      ( 24 May 2018 05:42 )             40.7     05-24    140  |  95<L>  |  85<HH>  ----------------------------<  117<H>  3.6   |  30  |  2.1<H>    Ca    7.9<L>      24 May 2018 05:42  Phos  6.3     05-23    TPro  5.2<L>  /  Alb  2.7<L>  /  TBili  0.6  /  DBili  x   /  AST  30  /  ALT  15  /  AlkPhos  95  05-23    PT/INR - ( 24 May 2018 05:42 )   PT: 24.20 sec;   INR: 2.20 ratio         PTT - ( 24 May 2018 05:42 )  PTT:84.1 sec      < from: 12 Lead ECG (05.21.18 @ 01:31) >  Ventricular Rate 86 BPM  Atrial Rate 15 BPM  QRS Duration 174 ms  Q-T Interval 504 ms  QTC Calculation(Bezet) 603 ms  R Axis 231 degrees  T Axis 90 degrees  Diagnosis Line Ventricular-paced rhythm with occasional AV dual-paced complexes  Biventricular pacemaker detected  Abnormal ECG  < end of copied text > INTERVAL HPI/OVERNIGHT EVENTS:    Pt seen and examined no complaints no events on tele    MEDICATIONS  (STANDING):  aspirin  chewable 81 milliGRAM(s) Oral daily  famotidine    Tablet 20 milliGRAM(s) Oral daily  furosemide   Injectable 60 milliGRAM(s) IV Push two times a day  metolazone 2.5 milliGRAM(s) Oral daily  metoprolol tartrate 25 milliGRAM(s) Oral two times a day  nystatin Powder 1 Application(s) Topical two times a day  simethicone 80 milliGRAM(s) Chew three times a day  warfarin 5 milliGRAM(s) Oral once    MEDICATIONS  (PRN):    Allergies  Allergy Status Unknown    Vital Signs Last 24 Hrs  T(C): 35.3 (24 May 2018 14:45), Max: 36.1 (24 May 2018 05:35)  T(F): 95.6 (24 May 2018 14:45), Max: 97 (24 May 2018 05:35)  HR: 98 (24 May 2018 16:48) (81 - 99)  BP: 105/63 (24 May 2018 16:48) (88/60 - 114/65)  BP(mean): --  RR: 18 (24 May 2018 16:48) (18 - 18)  SpO2: 98% (24 May 2018 05:26) (98% - 98%)    05-23-18 @ 07:01 - 05-24-18 @ 07:00  --------------------------------------------------------  IN: 784 mL / OUT: 1700 mL / NET: -916 mL    05-24-18 @ 07:01  - 05-24-18 @ 17:58  --------------------------------------------------------  IN: 450 mL / OUT: 450 mL / NET: 0 mL      Physical Exam    GENERAL: In no apparent distress, well nourished, and hydrated.  HEAD:  Atraumatic, Normocephalic  EYES: EOMI, PERRLA, conjunctiva and sclera clear  ENMT: No tonsillar erythema, exudates, or enlargements; ist mucous membranes, Good dentition, No lesions  NECK: Supple and normal thyroid.  No JVD or carotid bruit.  Carotid pulse is 2+ bilaterally.  HEART: Regular rate and rhythm; No murmurs, rubs, or gallops.  PULMONARY: Clear to auscultation and perfusion.  No rales, wheezing, or rhonchi bilaterally.  ABDOMEN: Soft, Nontender, Nondistended; Bowel sounds present  EXTREMITIES:  2+ Peripheral Pulses, No clubbing, cyanosis, or edema Lt BKA  LYMPH: No lymphadenopathy noted  NEUROLOGICAL: Grossly nonfocal    LABS:                      12.6   8.00  )-----------( 250      ( 24 May 2018 05:42 )             40.7     05-24    140  |  95<L>  |  85<HH>  ----------------------------<  117<H>  3.6   |  30  |  2.1<H>    Ca    7.9<L>      24 May 2018 05:42  Phos  6.3     05-23    TPro  5.2<L>  /  Alb  2.7<L>  /  TBili  0.6  /  DBili  x   /  AST  30  /  ALT  15  /  AlkPhos  95  05-23    PT/INR - ( 24 May 2018 05:42 )   PT: 24.20 sec;   INR: 2.20 ratio         PTT - ( 24 May 2018 05:42 )  PTT:84.1 sec      < from: 12 Lead ECG (05.21.18 @ 01:31) >  Ventricular Rate 86 BPM  Atrial Rate 15 BPM  QRS Duration 174 ms  Q-T Interval 504 ms  QTC Calculation(Bezet) 603 ms  R Axis 231 degrees  T Axis 90 degrees  Diagnosis Line Ventricular-paced rhythm with occasional AV dual-paced complexes  Biventricular pacemaker detected  Abnormal ECG  < end of copied text >

## 2018-05-24 NOTE — PROGRESS NOTE ADULT - ASSESSMENT
76 yo male with HFrEF s/p AICD/PPM, CAD s/p MI and CABG 2001, Gar's esophagus, Raynaud's PAD/PVD s/p L BKA presents with gradually worsening FRIAS for approximately 1 week. Admitted for CHF exacerbation, AICD battery end life and ZAC    1.	Ac. on Ch. HFrEF  2.	ZAC/ CKD-III  3.	CAD S/P CABG  4.	LV thrombus  5.	Pacemaker lead malfunction and low battery.  6.	Raynaud's disease  7.	PAD S/P Lt. BKA  8.	Rt. foot ischemia        PLAN:    ·	Cont tele  ·	Renal F/U noted  ·	Cont iv lasix 60 mg po q 12h and metolazone 2.5 mg po q 24h as per renal  ·	Check I'S and O'S and daily wt.  ·	Follow BMP. Baseline Cr is 1.5  ·	Cont IV heparin for 24 hrs even though the INR is theraputic. Follow PTT.  ·	EP F/U for pacemaker battery change and BV pacing  ·	Vascular eval  ·	Give coumadin 3 mg po tonight and check INR in am. 78 yo male with HFrEF s/p AICD/PPM, CAD s/p MI and CABG 2001, Gar's esophagus, Raynaud's PAD/PVD s/p L BKA presents with gradually worsening FRIAS for approximately 1 week. Admitted for CHF exacerbation, AICD battery end life and ZAC    1.	Ac. on Ch. HFrEF  2.	ZAC/ CKD-III  3.	CAD S/P CABG  4.	LV thrombus  5.	Pacemaker lead malfunction and low battery.  6.	Raynaud's disease  7.	PAD S/P Lt. BKA  8.	Rt. foot ischemia  9.	B/L groin fungal infection        PLAN:    ·	Cont tele  ·	Renal F/U noted  ·	Cont iv lasix 60 mg po q 12h and metolazone 2.5 mg po q 24h as per renal  ·	Check I'S and O'S and daily wt.  ·	Follow BMP. Baseline Cr is 1.5  ·	Cont IV heparin for 24 hrs even though the INR is theraputic. Follow PTT.  ·	EP F/U for pacemaker battery change and BV pacing  ·	Vascular eval  ·	Give coumadin 3 mg po tonight and check INR in am.  ·	Apply nystatin powder and antifungal cream to both groins

## 2018-05-25 ENCOUNTER — TRANSCRIPTION ENCOUNTER (OUTPATIENT)
Age: 78
End: 2018-05-25

## 2018-05-25 VITALS — WEIGHT: 199.96 LBS

## 2018-05-25 LAB
ANION GAP SERPL CALC-SCNC: 17 MMOL/L — HIGH (ref 7–14)
APTT BLD: 35.3 SEC — SIGNIFICANT CHANGE UP (ref 27–39.2)
APTT BLD: 37.1 SEC — SIGNIFICANT CHANGE UP (ref 27–39.2)
BASOPHILS # BLD AUTO: 0.05 K/UL — SIGNIFICANT CHANGE UP (ref 0–0.2)
BASOPHILS NFR BLD AUTO: 0.5 % — SIGNIFICANT CHANGE UP (ref 0–1)
BUN SERPL-MCNC: 92 MG/DL — CRITICAL HIGH (ref 10–20)
CALCIUM SERPL-MCNC: 8.1 MG/DL — LOW (ref 8.5–10.1)
CHLORIDE SERPL-SCNC: 93 MMOL/L — LOW (ref 98–110)
CO2 SERPL-SCNC: 30 MMOL/L — SIGNIFICANT CHANGE UP (ref 17–32)
CREAT SERPL-MCNC: 2.5 MG/DL — HIGH (ref 0.7–1.5)
EOSINOPHIL # BLD AUTO: 0.17 K/UL — SIGNIFICANT CHANGE UP (ref 0–0.7)
EOSINOPHIL NFR BLD AUTO: 1.8 % — SIGNIFICANT CHANGE UP (ref 0–8)
GLUCOSE SERPL-MCNC: 122 MG/DL — HIGH (ref 70–99)
HCT VFR BLD CALC: 42 % — SIGNIFICANT CHANGE UP (ref 42–52)
HGB BLD-MCNC: 13 G/DL — LOW (ref 14–18)
IMM GRANULOCYTES NFR BLD AUTO: 0.5 % — HIGH (ref 0.1–0.3)
INR BLD: 4.41 RATIO — HIGH (ref 0.65–1.3)
LYMPHOCYTES # BLD AUTO: 0.47 K/UL — LOW (ref 1.2–3.4)
LYMPHOCYTES # BLD AUTO: 5 % — LOW (ref 20.5–51.1)
MCHC RBC-ENTMCNC: 24.6 PG — LOW (ref 27–31)
MCHC RBC-ENTMCNC: 31 G/DL — LOW (ref 32–37)
MCV RBC AUTO: 79.5 FL — LOW (ref 80–94)
MONOCYTES # BLD AUTO: 1.44 K/UL — HIGH (ref 0.1–0.6)
MONOCYTES NFR BLD AUTO: 15.4 % — HIGH (ref 1.7–9.3)
NEUTROPHILS # BLD AUTO: 7.15 K/UL — HIGH (ref 1.4–6.5)
NEUTROPHILS NFR BLD AUTO: 76.8 % — HIGH (ref 42.2–75.2)
NRBC # BLD: 0 /100 WBCS — SIGNIFICANT CHANGE UP (ref 0–0)
PLATELET # BLD AUTO: 259 K/UL — SIGNIFICANT CHANGE UP (ref 130–400)
POTASSIUM SERPL-MCNC: 4 MMOL/L — SIGNIFICANT CHANGE UP (ref 3.5–5)
POTASSIUM SERPL-SCNC: 4 MMOL/L — SIGNIFICANT CHANGE UP (ref 3.5–5)
PROTHROM AB SERPL-ACNC: >40 SEC — HIGH (ref 9.95–12.87)
RBC # BLD: 5.28 M/UL — SIGNIFICANT CHANGE UP (ref 4.7–6.1)
RBC # FLD: 17.5 % — HIGH (ref 11.5–14.5)
SODIUM SERPL-SCNC: 140 MMOL/L — SIGNIFICANT CHANGE UP (ref 135–146)
WBC # BLD: 9.33 K/UL — SIGNIFICANT CHANGE UP (ref 4.8–10.8)
WBC # FLD AUTO: 9.33 K/UL — SIGNIFICANT CHANGE UP (ref 4.8–10.8)

## 2018-05-25 RX ORDER — NYSTATIN CREAM 100000 [USP'U]/G
1 CREAM TOPICAL
Qty: 10 | Refills: 0 | OUTPATIENT
Start: 2018-05-25 | End: 2018-06-23

## 2018-05-25 RX ORDER — MORPHINE SULFATE 50 MG/1
2 CAPSULE, EXTENDED RELEASE ORAL ONCE
Qty: 0 | Refills: 0 | Status: DISCONTINUED | OUTPATIENT
Start: 2018-05-25 | End: 2018-05-25

## 2018-05-25 RX ORDER — GABAPENTIN 400 MG/1
300 CAPSULE ORAL
Qty: 0 | Refills: 0 | Status: DISCONTINUED | OUTPATIENT
Start: 2018-05-25 | End: 2018-05-25

## 2018-05-25 RX ORDER — FUROSEMIDE 40 MG
80 TABLET ORAL DAILY
Qty: 0 | Refills: 0 | Status: DISCONTINUED | OUTPATIENT
Start: 2018-05-25 | End: 2018-05-25

## 2018-05-25 RX ORDER — WARFARIN SODIUM 2.5 MG/1
1 TABLET ORAL
Qty: 5 | Refills: 0 | OUTPATIENT
Start: 2018-05-25 | End: 2018-05-29

## 2018-05-25 RX ORDER — ASPIRIN/CALCIUM CARB/MAGNESIUM 324 MG
1 TABLET ORAL
Qty: 30 | Refills: 0 | OUTPATIENT
Start: 2018-05-25 | End: 2018-06-23

## 2018-05-25 RX ORDER — METOPROLOL TARTRATE 50 MG
1 TABLET ORAL
Qty: 60 | Refills: 0 | OUTPATIENT
Start: 2018-05-25 | End: 2018-06-23

## 2018-05-25 RX ORDER — GABAPENTIN 400 MG/1
1 CAPSULE ORAL
Qty: 60 | Refills: 0 | OUTPATIENT
Start: 2018-05-25 | End: 2018-06-23

## 2018-05-25 RX ADMIN — GABAPENTIN 300 MILLIGRAM(S): 400 CAPSULE ORAL at 01:44

## 2018-05-25 RX ADMIN — NYSTATIN CREAM 1 APPLICATION(S): 100000 CREAM TOPICAL at 05:20

## 2018-05-25 RX ADMIN — SIMETHICONE 80 MILLIGRAM(S): 80 TABLET, CHEWABLE ORAL at 11:18

## 2018-05-25 RX ADMIN — Medication 60 MILLIGRAM(S): at 05:15

## 2018-05-25 RX ADMIN — GABAPENTIN 300 MILLIGRAM(S): 400 CAPSULE ORAL at 05:16

## 2018-05-25 RX ADMIN — SIMETHICONE 80 MILLIGRAM(S): 80 TABLET, CHEWABLE ORAL at 05:16

## 2018-05-25 RX ADMIN — Medication 81 MILLIGRAM(S): at 11:18

## 2018-05-25 RX ADMIN — FAMOTIDINE 20 MILLIGRAM(S): 10 INJECTION INTRAVENOUS at 11:18

## 2018-05-25 NOTE — DISCHARGE NOTE ADULT - CARE PROVIDER_API CALL
Pelon Paz), Cardiovascular Disease; Internal Medicine; Interventional Cardiology; Nuclear Cardiology  [address] [facility] [address]  [place] [address]  Phone: [phone]  Fax: [phone]    Cristofer Sosa; ANTHONY), Cardiac Electrophysiology; Cardiovascular Disease  [address] [place] [address]  Phone: [phone]  Fax: [phone]    Neel Hendrickson), Surgery; Vascular Surgery  [address] [place] [address]  Phone: [phone]  Fax: [phone]

## 2018-05-25 NOTE — PROGRESS NOTE ADULT - ASSESSMENT
78 yo male with HFrEF s/p AICD/PPM, CAD s/p MI and CABG 2001, Gar's esophagus, Raynaud's PAD/PVD s/p L BKA presenting with SOB / CHF exacerbation     ·	ZAC on CKD 3(patient said his baseline creatinine is 1.5) rule out CRS type 2/ diabetic nephropathy  ·	looks euvolemic on exam can decrease lasix to 60 mg po once daily with metalazone peña other day  ·	strict I and O  ·	renal bladder sono noted no hydro   ·	Avoid nephrotoxins avoid hypotension   ·	ASPVD sp left BKA   ·	CAD/ CHF appreciate cardio notes   ·	left ventricle thrombus on coumadin/ UFH   ·	appreciate cardio notes

## 2018-05-25 NOTE — DISCHARGE NOTE ADULT - HOSPITAL COURSE
78 yo male with HFrEF s/p AICD/PPM, CAD s/p MI and CABG 2001, Gar's esophagus, Raynaud's PAD/PVD s/p L BKA presents with gradually worsening FRIAS for approximately 1 week. Admitted for CHF exacerbation, AICD battery end life and ZAC. SOB likely 2/2 decompensated HF resolving with IV lasix now transitioned to PO lasix. LV thrombus found on Echo now on coumadin for AC. AICD malfunctioned followed by EP and pace interrogated on 5/21/ and 5/25 with close outpatient follwoup. Please follow up with Dr. Hendrickson within 1 week for right leg ischemic changes.

## 2018-05-25 NOTE — PROGRESS NOTE ADULT - PROVIDER SPECIALTY LIST ADULT
Cardiology
Electrophysiology
Hospitalist
Internal Medicine
Nephrology
Podiatry
Podiatry
Internal Medicine
Cardiology

## 2018-05-25 NOTE — DISCHARGE NOTE ADULT - MEDICATION SUMMARY - MEDICATIONS TO STOP TAKING
I will STOP taking the medications listed below when I get home from the hospital:    Lasix 80 mg oral tablet  -- 1 tab(s) by mouth once a day

## 2018-05-25 NOTE — PROGRESS NOTE ADULT - ASSESSMENT
78 yo male with HFrEF s/p AICD/PPM, CAD s/p MI and CABG 2001, Gar's esophagus, Raynaud's PAD/PVD s/p L BKA presents with gradually worsening FRIAS for approximately 1 week. Admitted for CHF exacerbation, AICD battery end life and ZAC.      # SOB likely 2/2 decompensated HF  - transition to PO lasix 80mg every other day from IV 60  - will optimize medical management as pt initially reported he was taking lasix every other day at home.  - Repeat echo showed EF of <20%    # LV thrombus  - coumadin, now therapeutic    # AICD malfunction:  -Device interrogation by EPS done 5/21 and 5/24  - f/u Dr. Sosa is consulted --> device interrogated at [facility], will follow up outpatient    # ZAC on CKD versus progression of CKD or cardiorenal syndrome: baseline 1.4-1.5 in 2017  - creatinine is 2.1  - no hydro on USG    # Left BKA  -stump is clean no erythema or discharge    # Right ischemic foot changes  - podiatry and vascular saw  - f/u arterial and venous duplex --> patient refused, will follow up as outpatient with Dr. Hendrickson next week    GI ppx: on pepcid 20 mg.  DVT ppx: coumadin    DISPO: home, full code, anticipated for tomorrow for discharge, coumadin clinic to come this afternoon 78 yo male with HFrEF s/p AICD/PPM, CAD s/p MI and CABG 2001, Gar's esophagus, Raynaud's PAD/PVD s/p L BKA presents with gradually worsening FRIAS for approximately 1 week. Admitted for CHF exacerbation, AICD battery end life and ZAC.      # SOB likely 2/2 decompensated HF  - transition to PO lasix 80mg every other day from IV 60  - will optimize medical management as pt initially reported he was taking lasix every other day at home.  - Repeat echo showed EF of <20%    # LV thrombus  - coumadin, now therapeutic    # AICD malfunction:  -Device interrogation by EPS done 5/21 and 5/24  - f/u Dr. Sosa is consulted --> device interrogated at [facility], will follow up outpatient    # ZAC on CKD versus progression of CKD or cardiorenal syndrome: baseline 1.4-1.5 in 2017  - creatinine is 2.1  - no hydro on USG    # Left BKA  -stump is clean no erythema or discharge    # Right ischemic foot changes  - podiatry and vascular saw  - f/u arterial and venous duplex --> patient refused, will follow up as outpatient with Dr. Hendrickson next week    GI ppx: on pepcid 20 mg.  DVT ppx: coumadin, INR supratherapeutic so will hold coumadin for today    DISPO: home, full code, anticipated for tomorrow for discharge, coumadin clinic to come this afternoon

## 2018-05-25 NOTE — DISCHARGE NOTE ADULT - ADDITIONAL INSTRUCTIONS
please follow up with Dr. Hendrickson within 1 week for right foot ischemic changes and Dr. Sosa for AICD.

## 2018-05-25 NOTE — DISCHARGE NOTE ADULT - PLAN OF CARE
workup and treatment resolved with iv lasix, now transitioned to PO lasix seen by EPS and interrogated pace interorrgated and with close EP outpatient followup LV thrombus on coumadin

## 2018-05-25 NOTE — DISCHARGE NOTE ADULT - CARE PROVIDERS DIRECT ADDRESSES
[email or web address]

## 2018-05-25 NOTE — PROGRESS NOTE ADULT - SUBJECTIVE AND OBJECTIVE BOX
LENGTH OF HOSPITAL STAY: 6d    CHIEF COMPLAINT:   Patient is a 77y old  Male who presents with a chief complaint of sob (25 May 2018 10:25)      Overnight events:    No acute events overnight    ALLERGIES:  morphine (Hives)    MEDICATIONS:  STANDING MEDICATIONS  aspirin  chewable 81 milliGRAM(s) Oral daily  famotidine    Tablet 20 milliGRAM(s) Oral daily  furosemide    Tablet 80 milliGRAM(s) Oral daily  gabapentin 300 milliGRAM(s) Oral two times a day  metolazone 2.5 milliGRAM(s) Oral daily  metoprolol tartrate 25 milliGRAM(s) Oral two times a day  nystatin Powder 1 Application(s) Topical two times a day  simethicone 80 milliGRAM(s) Chew three times a day    PRN MEDICATIONS    VITALS:   T(F): 97  HR: 94  BP: 95/52  RR: 18  SpO2: 96%    LABS:                        13.0   9.33  )-----------( 259      ( 25 May 2018 06:23 )             42.0     05-25    140  |  93<L>  |  92<HH>  ----------------------------<  122<H>  4.0   |  30  |  2.5<H>    Ca    8.1<L>      25 May 2018 06:23  Phos  6.3     05-23      PT/INR - ( 25 May 2018 06:23 )   PT: >40.00 sec;   INR: 4.41 ratio         PTT - ( 25 May 2018 06:23 )  PTT:37.1 sec      Cultures:      RADIOLOGY:    PHYSICAL EXAM:  GEN: No acute distress  LUNGS: Clear to auscultation bilaterally   HEART: S1/S2 present. RRR.   ABD: Soft, non-tender, non-distended. Bowel sounds present  EXT: right leg ischemia, left leg bka  NEURO: AAOX3

## 2018-05-25 NOTE — PROGRESS NOTE ADULT - SUBJECTIVE AND OBJECTIVE BOX
78 y/o male with PMH below admitted for CHF. Pt seen at bedside NAD. Pt with right foot ulcer. pt said that his right foot is painful and he applies lidocaine patch 5%.     HPI:  78 yo male with HFrEF s/p AICD/PPM, CAD s/p MI and CABG 2001, Gar's esophagus, Raynaud's PAD/PVD s/p L BKA presents with gradually worsening FRIAS for approximately 1 week. Patient states that he feels fine at rest, however becomes SOB when moving or changing positions. He cannot walk a block and it takes him a very long time to walk up a few steps in his house. He denies any CP, palpitations, cough, dizziness or lightheadedness. Of note, patient saw his EP Dr Sosa a few months ago, and was told that his AICD battery is nearing end of life and that one of the leads is not working properly. In the ED, he was found to be in fluid overload with a BNP 52282, mildly elevated troponins and ZAC. He did not receive any medications    Patient states that he is prescribed Lasix 80, which he takes every other day, last taken 2 days ago (19 May 2018 18:21)    PMH: PLEURAL EFFUSION CHF CONGESTIVE HEART FAILURE AICD BATTERY FAILURE  Raynauds phenomenon  Endarteritis  CAD (coronary artery disease)  Stented coronary artery  MI (myocardial infarction)  Pacemaker  DM (diabetes mellitus)  Gar esophagus  CHF (congestive heart failure)    PSH: S/P CABG (coronary artery bypass graft)  S/P BKA (below knee amputation), left    Medication   Allergy: Allergy Status Unknown    Vital Signs Last 24 Hrs  T(C): 36.1 (25 May 2018 05:35), Max: 36.1 (25 May 2018 05:35)  T(F): 97 (25 May 2018 05:35), Max: 97 (25 May 2018 05:35)  HR: 94 (25 May 2018 05:35) (94 - 117)  BP: 95/52 (25 May 2018 05:35) (95/52 - 105/63)  BP(mean): --  RR: 18 (25 May 2018 05:35) (18 - 18)  SpO2: 96% (24 May 2018 23:01) (96% - 96%)                          12.6   8.00  )-----------( 250      ( 24 May 2018 05:42 )             40.7         O:   Derm: right medial 1st mpj superficial ulcer that fibrotic. no active sign of infection noted. right foot cyanotic and cool to touch. dorsal 1st toe nail ulcer that's fibrotic with no sign of infection. Pain upon palpation right foot.    Vascular: Dorsalis Pedis and Posterior Tibial pulses 0/4.    MSK: Left BKA        Assessment and Plan:   Pt with right medial 1st mpj superficial fibrotic ulcer and dorsal 1st toe superficial fibrotic ulcer  lidocaine patch 5% applied.  Podiatry will f/u

## 2018-05-25 NOTE — DISCHARGE NOTE ADULT - PATIENT PORTAL LINK FT
You can access the [facility] Portal, offered by [facility], by registering with the following website: [email or web address]

## 2018-05-25 NOTE — PROGRESS NOTE ADULT - SUBJECTIVE AND OBJECTIVE BOX
Nephrology progress note    Patient is seen and examined, events over the last 24 h noted .  no chest pain,. no SOB  doesnt look in distress    Allergies:  morphine (Hives)    Hospital Medications:   MEDICATIONS  (STANDING):  aspirin  chewable 81 milliGRAM(s) Oral daily  famotidine    Tablet 20 milliGRAM(s) Oral daily  furosemide    Tablet 80 milliGRAM(s) Oral daily  gabapentin 300 milliGRAM(s) Oral two times a day  metolazone 2.5 milliGRAM(s) Oral daily  metoprolol tartrate 25 milliGRAM(s) Oral two times a day  nystatin Powder 1 Application(s) Topical two times a day  simethicone 80 milliGRAM(s) Chew three times a day        VITALS:  T(F): 97 (05-25-18 @ 05:35), Max: 97 (05-25-18 @ 05:35)  HR: 94 (05-25-18 @ 05:35)  BP: 95/52 (05-25-18 @ 05:35)  RR: 18 (05-25-18 @ 05:35)  SpO2: 96% (05-24-18 @ 23:01)  Wt(kg): --    05-23 @ 07:01  -  05-24 @ 07:00  --------------------------------------------------------  IN: 784 mL / OUT: 1700 mL / NET: -916 mL    05-24 @ 07:01  -  05-25 @ 07:00  --------------------------------------------------------  IN: 450 mL / OUT: 1350 mL / NET: -900 mL          PHYSICAL EXAM:  Constitutional: NAD  HEENT: anicteric sclera, oropharynx clear, MMM  Neck: No JVD  Respiratory: minimal basal crackles  Cardiovascular: S1, S2, RRR  Gastrointestinal: BS+, soft, NT/ND  Extremities: bluish discoloration of lower extremities, BKA left  :  No delcid.   Skin: No rashes    LABS:  05-25    140  |  93<L>  |  92<HH>  ----------------------------<  122<H>  4.0   |  30  |  2.5<H>    Ca    8.1<L>      25 May 2018 06:23  Phos  6.3     05-23    2.5 <--, 2.1 <--, 2.1 <--, 2.2 <--, 2.2 <--, 2.2 <--, 2.6 <-- trend in crt                        13.0   9.33  )-----------( 259      ( 25 May 2018 06:23 )             42.0

## 2018-05-25 NOTE — DISCHARGE NOTE ADULT - MEDICATION SUMMARY - MEDICATIONS TO TAKE
I will START or STAY ON the medications listed below when I get home from the hospital:    Alivio 400 mg oral tablet  -- 1 tab(s) by mouth 4 times a day  -- Do not take this drug if you are pregnant.  It is very important that you take or use this exactly as directed.  Do not skip doses or discontinue unless directed by your doctor.  May cause drowsiness or dizziness.  Obtain medical advice before taking any non-prescription drugs as some may affect the action of this medication.  Take with food or milk.    -- Indication: For Pain    aspirin 81 mg oral tablet, chewable  -- 1 tab(s) by mouth once a day  -- Indication: For Cad    gabapentin 300 mg oral capsule  -- 1 cap(s) by mouth 2 times a day  -- Indication: For Pain    metoprolol tartrate 25 mg oral tablet  -- 1 tab(s) by mouth 2 times a day  -- Indication: For htn    nystatin 100,000 units/g topical powder  -- 1 application on skin 2 times a day  -- Indication: For Antifungal    metOLazone 2.5 mg oral tablet  -- 1 tab(s) by mouth once a day  -- Indication: For htn    Lasix 80 mg oral tablet  -- 1 tab(s) by mouth every other day (at bedtime)  -- Indication: For CHF (congestive heart failure)    famotidine 20 mg oral tablet  -- 5 tab(s) by mouth once a day, As Needed  -- Indication: For gerd I will START or STAY ON the medications listed below when I get home from the hospital:    Alivio 400 mg oral tablet  -- 1 tab(s) by mouth 4 times a day  -- Do not take this drug if you are pregnant.  It is very important that you take or use this exactly as directed.  Do not skip doses or discontinue unless directed by your doctor.  May cause drowsiness or dizziness.  Obtain medical advice before taking any non-prescription drugs as some may affect the action of this medication.  Take with food or milk.    -- Indication: For Pain    aspirin 81 mg oral tablet, chewable  -- 1 tab(s) by mouth once a day  -- Indication: For Cad    Coumadin 2.5 mg oral tablet  -- 1 tab(s) by mouth once a day   -- Do not take this drug if you are pregnant.  It is very important that you take or use this exactly as directed.  Do not skip doses or discontinue unless directed by your doctor.  Obtain medical advice before taking any non-prescription drugs as some may affect the action of this medication.    -- Indication: For Thrombus    gabapentin 300 mg oral capsule  -- 1 cap(s) by mouth 2 times a day  -- Indication: For Pain    metoprolol tartrate 25 mg oral tablet  -- 1 tab(s) by mouth 2 times a day  -- Indication: For htn    nystatin 100,000 units/g topical powder  -- 1 application on skin 2 times a day  -- Indication: For Antifungal    metOLazone 2.5 mg oral tablet  -- 1 tab(s) by mouth once a day  -- Indication: For htn    Lasix 80 mg oral tablet  -- 1 tab(s) by mouth every other day (at bedtime)  -- Indication: For CHF (congestive heart failure)    famotidine 20 mg oral tablet  -- 5 tab(s) by mouth once a day, As Needed  -- Indication: For gerd

## 2018-05-30 DIAGNOSIS — I73.00 RAYNAUD'S SYNDROME WITHOUT GANGRENE: ICD-10-CM

## 2018-05-30 DIAGNOSIS — N17.9 ACUTE KIDNEY FAILURE, UNSPECIFIED: ICD-10-CM

## 2018-05-30 DIAGNOSIS — T82.118A BREAKDOWN (MECHANICAL) OF OTHER CARDIAC ELECTRONIC DEVICE, INITIAL ENCOUNTER: ICD-10-CM

## 2018-05-30 DIAGNOSIS — Z95.5 PRESENCE OF CORONARY ANGIOPLASTY IMPLANT AND GRAFT: ICD-10-CM

## 2018-05-30 DIAGNOSIS — Z95.1 PRESENCE OF AORTOCORONARY BYPASS GRAFT: ICD-10-CM

## 2018-05-30 DIAGNOSIS — I25.10 ATHEROSCLEROTIC HEART DISEASE OF NATIVE CORONARY ARTERY WITHOUT ANGINA PECTORIS: ICD-10-CM

## 2018-05-30 DIAGNOSIS — R06.02 SHORTNESS OF BREATH: ICD-10-CM

## 2018-05-30 DIAGNOSIS — I13.0 HYPERTENSIVE HEART AND CHRONIC KIDNEY DISEASE WITH HEART FAILURE AND STAGE 1 THROUGH STAGE 4 CHRONIC KIDNEY DISEASE, OR UNSPECIFIED CHRONIC KIDNEY DISEASE: ICD-10-CM

## 2018-05-30 DIAGNOSIS — I25.2 OLD MYOCARDIAL INFARCTION: ICD-10-CM

## 2018-05-30 DIAGNOSIS — K22.70 BARRETT'S ESOPHAGUS WITHOUT DYSPLASIA: ICD-10-CM

## 2018-05-30 DIAGNOSIS — L97.519 NON-PRESSURE CHRONIC ULCER OF OTHER PART OF RIGHT FOOT WITH UNSPECIFIED SEVERITY: ICD-10-CM

## 2018-05-30 DIAGNOSIS — I82.90 ACUTE EMBOLISM AND THROMBOSIS OF UNSPECIFIED VEIN: ICD-10-CM

## 2018-05-30 DIAGNOSIS — N18.3 CHRONIC KIDNEY DISEASE, STAGE 3 (MODERATE): ICD-10-CM

## 2018-05-30 DIAGNOSIS — E11.9 TYPE 2 DIABETES MELLITUS WITHOUT COMPLICATIONS: ICD-10-CM

## 2018-05-30 DIAGNOSIS — Z89.512 ACQUIRED ABSENCE OF LEFT LEG BELOW KNEE: ICD-10-CM

## 2018-05-30 DIAGNOSIS — I50.23 ACUTE ON CHRONIC SYSTOLIC (CONGESTIVE) HEART FAILURE: ICD-10-CM

## 2018-05-30 DIAGNOSIS — Z95.810 PRESENCE OF AUTOMATIC (IMPLANTABLE) CARDIAC DEFIBRILLATOR: ICD-10-CM

## 2018-05-31 ENCOUNTER — APPOINTMENT (OUTPATIENT)
Dept: CARDIOLOGY | Facility: CLINIC | Age: 78
End: 2018-05-31

## 2018-06-07 ENCOUNTER — APPOINTMENT (OUTPATIENT)
Dept: VASCULAR SURGERY | Facility: CLINIC | Age: 78
End: 2018-06-07

## 2018-07-25 ENCOUNTER — APPOINTMENT (OUTPATIENT)
Dept: CARDIOLOGY | Facility: CLINIC | Age: 78
End: 2018-07-25

## 2018-08-02 PROBLEM — I73.9 PVD (PERIPHERAL VASCULAR DISEASE): Status: ACTIVE | Noted: 2018-08-02

## 2018-08-06 ENCOUNTER — APPOINTMENT (OUTPATIENT)
Dept: VASCULAR SURGERY | Facility: CLINIC | Age: 78
End: 2018-08-06

## 2018-08-06 DIAGNOSIS — I73.9 PERIPHERAL VASCULAR DISEASE, UNSPECIFIED: ICD-10-CM

## 2018-08-31 ENCOUNTER — APPOINTMENT (OUTPATIENT)
Dept: CARDIOLOGY | Facility: CLINIC | Age: 78
End: 2018-08-31

## 2019-10-10 NOTE — ED PROVIDER NOTE - CARE PLAN
Shortness of breath and hypotension.
Principal Discharge DX:	Rib fracture  Secondary Diagnosis:	Fall  Secondary Diagnosis:	Head injury

## 2021-02-19 NOTE — PATIENT PROFILE ADULT. - TEACHING/LEARNING CULTURAL CONSIDERATIONS
Date of Service: 8/1/18    Clinical summary: Seizure disorder    Interpretation:     The background rhythm consists of moderately well organized and average amplitude waves of 8 Hz.  There were no asymmetries seen.  Hyperventilation was not performed.  Photic stimulation did not produce any abnormalities. Intermittent muslce and movement artificact was noted. Drowsiness are seen with attenuation of background activity and central theta slowing noted. Deeper stages of sleep are not achieved during this recording. No epileptiform discharges were seen.  No electrographic seizures were noted.    Impression:     This is a normal EEG in the awake and drowsy states only.  No focal, lateralized or epileptiform discharges were seen.          Crystal Barksdale M.D.         none
